# Patient Record
Sex: MALE | Race: BLACK OR AFRICAN AMERICAN | NOT HISPANIC OR LATINO | Employment: OTHER | URBAN - METROPOLITAN AREA
[De-identification: names, ages, dates, MRNs, and addresses within clinical notes are randomized per-mention and may not be internally consistent; named-entity substitution may affect disease eponyms.]

---

## 2020-10-16 ENCOUNTER — OFFICE VISIT (OUTPATIENT)
Dept: PODIATRY | Facility: CLINIC | Age: 75
End: 2020-10-16
Payer: MEDICARE

## 2020-10-16 VITALS
BODY MASS INDEX: 30.45 KG/M2 | SYSTOLIC BLOOD PRESSURE: 133 MMHG | HEIGHT: 75 IN | HEART RATE: 79 BPM | DIASTOLIC BLOOD PRESSURE: 70 MMHG | WEIGHT: 244.94 LBS

## 2020-10-16 DIAGNOSIS — E11.51 TYPE II DIABETES MELLITUS WITH PERIPHERAL CIRCULATORY DISORDER: Primary | ICD-10-CM

## 2020-10-16 DIAGNOSIS — B35.1 DERMATOPHYTOSIS OF NAIL: ICD-10-CM

## 2020-10-16 PROCEDURE — 99203 OFFICE O/P NEW LOW 30 MIN: CPT | Mod: 25,S$GLB,, | Performed by: PODIATRIST

## 2020-10-16 PROCEDURE — 1126F PR PAIN SEVERITY QUANTIFIED, NO PAIN PRESENT: ICD-10-PCS | Mod: S$GLB,,, | Performed by: PODIATRIST

## 2020-10-16 PROCEDURE — 1126F AMNT PAIN NOTED NONE PRSNT: CPT | Mod: S$GLB,,, | Performed by: PODIATRIST

## 2020-10-16 PROCEDURE — 1159F MED LIST DOCD IN RCRD: CPT | Mod: S$GLB,,, | Performed by: PODIATRIST

## 2020-10-16 PROCEDURE — 1159F PR MEDICATION LIST DOCUMENTED IN MEDICAL RECORD: ICD-10-PCS | Mod: S$GLB,,, | Performed by: PODIATRIST

## 2020-10-16 PROCEDURE — 99999 PR PBB SHADOW E&M-NEW PATIENT-LVL III: ICD-10-PCS | Mod: PBBFAC,,, | Performed by: PODIATRIST

## 2020-10-16 PROCEDURE — 99203 PR OFFICE/OUTPT VISIT, NEW, LEVL III, 30-44 MIN: ICD-10-PCS | Mod: 25,S$GLB,, | Performed by: PODIATRIST

## 2020-10-16 PROCEDURE — 11721 PR DEBRIDEMENT OF NAILS, 6 OR MORE: ICD-10-PCS | Mod: Q8,S$GLB,, | Performed by: PODIATRIST

## 2020-10-16 PROCEDURE — 11721 DEBRIDE NAIL 6 OR MORE: CPT | Mod: Q8,S$GLB,, | Performed by: PODIATRIST

## 2020-10-16 PROCEDURE — 99999 PR PBB SHADOW E&M-NEW PATIENT-LVL III: CPT | Mod: PBBFAC,,, | Performed by: PODIATRIST

## 2020-10-16 RX ORDER — MEMANTINE HYDROCHLORIDE 10 MG/1
TABLET ORAL
COMMUNITY
Start: 2020-10-01

## 2020-10-16 RX ORDER — DEXTROSE 4 G
TABLET,CHEWABLE ORAL
COMMUNITY
Start: 2020-08-21

## 2020-10-16 RX ORDER — TRAZODONE HYDROCHLORIDE 50 MG/1
50 TABLET ORAL DAILY
COMMUNITY
Start: 2020-10-01

## 2020-10-16 RX ORDER — BENZONATATE 100 MG/1
100 CAPSULE ORAL 3 TIMES DAILY PRN
COMMUNITY

## 2020-10-16 RX ORDER — PANTOPRAZOLE SODIUM 40 MG/1
TABLET, DELAYED RELEASE ORAL
COMMUNITY
Start: 2020-10-01

## 2020-10-16 RX ORDER — ACETAMINOPHEN 500 MG
500 TABLET ORAL EVERY 6 HOURS PRN
COMMUNITY

## 2020-10-16 RX ORDER — AMLODIPINE BESYLATE 10 MG/1
10 TABLET ORAL DAILY
COMMUNITY
Start: 2020-09-15

## 2020-10-16 RX ORDER — LORAZEPAM 1 MG/1
TABLET ORAL
COMMUNITY
Start: 2019-10-24

## 2020-10-16 RX ORDER — IRON,CARBONYL/ASCORBIC ACID 100-250 MG
TABLET ORAL
COMMUNITY
Start: 2020-06-26

## 2020-10-16 RX ORDER — INSULIN GLARGINE 100 [IU]/ML
20 INJECTION, SOLUTION SUBCUTANEOUS
COMMUNITY
Start: 2020-04-14

## 2020-10-16 RX ORDER — BLOOD SUGAR DIAGNOSTIC
STRIP MISCELLANEOUS
COMMUNITY
Start: 2020-08-20

## 2020-10-16 RX ORDER — TERAZOSIN 2 MG/1
CAPSULE ORAL
COMMUNITY
Start: 2020-10-12

## 2020-10-16 RX ORDER — DONEPEZIL HYDROCHLORIDE 10 MG/1
TABLET, FILM COATED ORAL
COMMUNITY
Start: 2020-10-01

## 2020-10-16 RX ORDER — ATORVASTATIN CALCIUM 20 MG/1
TABLET, FILM COATED ORAL
COMMUNITY
Start: 2020-10-01

## 2020-10-16 RX ORDER — LANCETS 30 GAUGE
EACH MISCELLANEOUS
COMMUNITY
Start: 2020-09-08

## 2020-10-16 RX ORDER — CHOLECALCIFEROL (VITAMIN D3) 25 MCG
TABLET ORAL
COMMUNITY
Start: 2020-09-15

## 2020-10-16 RX ORDER — PEN NEEDLE, DIABETIC 31 GX5/16"
NEEDLE, DISPOSABLE MISCELLANEOUS
COMMUNITY
Start: 2020-08-03

## 2020-10-16 RX ORDER — LOSARTAN POTASSIUM 100 MG/1
TABLET ORAL
COMMUNITY
Start: 2020-10-01

## 2020-10-25 PROBLEM — E11.51 TYPE II DIABETES MELLITUS WITH PERIPHERAL CIRCULATORY DISORDER: Status: ACTIVE | Noted: 2020-10-25

## 2020-10-26 NOTE — PROGRESS NOTES
"Subjective:     Patient ID: Suraj Giraldo is a 74 y.o. male.    Chief Complaint: Diabetic Foot Exam (no c/o pain. wears tennis shoes with socks. diabteic Pt. PCP Dr. Thacker.)    Suraj is a 74 y.o. male who presents to the clinic for evaluation and treatment of high risk feet. Suraj has a past medical history of CHF (congestive heart failure), Diabetes mellitus, type 2, Disorder of kidney and ureter, Hypertension, and Stroke. The patient's chief complaint is long, thick toenails. This patient has documented high risk feet requiring routine maintenance secondary to diabetes mellitis and those secondary complications of diabetes, as mentioned..    PCP: Deborah Thacker MD    Date Last Seen by PCP: 9/12/2020    Current shoe gear:  Affected Foot: Tennis shoes     Unaffected Foot: Tennis shoes    Hemoglobin A1C   Date Value Ref Range Status   09/17/2020 5.7 (H) 4.8 - 5.6 % Final     Comment:              Prediabetes: 5.7 - 6.4           Diabetes: >6.4           Glycemic control for adults with diabetes: <7.0   04/07/2020 4.7 <=6.5 % Final       Patient Active Problem List   Diagnosis    Type II diabetes mellitus with peripheral circulatory disorder       Medication List with Changes/Refills   Current Medications    ACETAMINOPHEN (TYLENOL) 500 MG TABLET    Take 500 mg by mouth every 6 (six) hours as needed.    AMLODIPINE (NORVASC) 10 MG TABLET    Take 10 mg by mouth once daily.    ATORVASTATIN (LIPITOR) 20 MG TABLET        BD ULTRA-FINE ANTHONY PEN NEEDLE 32 GAUGE X 5/32" NDLE        BENZONATATE (TESSALON) 100 MG CAPSULE    Take 100 mg by mouth 3 (three) times daily as needed.    BLOOD SUGAR DIAGNOSTIC (ACCU-CHEK KYLIE PLUS TEST STRP) STRP    Test twice daily    BLOOD-GLUCOSE METER (ACCU-CHEK KYLIE PLUS METER) MISC    USE TO MONITOR BLOOD GLUCOSE    DONEPEZIL (ARICEPT) 10 MG TABLET        INSULIN GLARGINE 100 UNITS/ML (3ML) SUBQ PEN    Inject 20 Units into the skin.    IRON-VITAMIN C 100-250 MG, ICAR-C, 100-250 MG " "TAB    TAKE 1 TABLET BY MOUTH TWICE DAILY    LORAZEPAM (ATIVAN) 1 MG TABLET    TAKE 1 TABLET BY MOUTH TWICE DAILY AS NEEDED FOR ANXIETY    LOSARTAN (COZAAR) 100 MG TABLET        MEMANTINE (NAMENDA) 10 MG TAB        PANTOPRAZOLE (PROTONIX) 40 MG TABLET        SURE COMFORT LANCETS 30 GAUGE MISC        TERAZOSIN (HYTRIN) 2 MG CAPSULE        TRAZODONE (DESYREL) 50 MG TABLET    Take 50 mg by mouth once daily.    VITAMIN D (VITAMIN D3) 1000 UNITS TAB    TAKE 1 TABLET BY MOUTH DAILY       Review of patient's allergies indicates:  No Known Allergies    History reviewed. No pertinent surgical history.    History reviewed. No pertinent family history.    Social History     Socioeconomic History    Marital status:      Spouse name: Not on file    Number of children: Not on file    Years of education: Not on file    Highest education level: Not on file   Occupational History    Not on file   Social Needs    Financial resource strain: Not on file    Food insecurity     Worry: Not on file     Inability: Not on file    Transportation needs     Medical: Not on file     Non-medical: Not on file   Tobacco Use    Smoking status: Never Smoker    Smokeless tobacco: Former User   Substance and Sexual Activity    Alcohol use: Never     Frequency: Never    Drug use: Never    Sexual activity: Not on file   Lifestyle    Physical activity     Days per week: Not on file     Minutes per session: Not on file    Stress: Not on file   Relationships    Social connections     Talks on phone: Not on file     Gets together: Not on file     Attends Christian service: Not on file     Active member of club or organization: Not on file     Attends meetings of clubs or organizations: Not on file     Relationship status: Not on file   Other Topics Concern    Not on file   Social History Narrative    Not on file       Vitals:    10/16/20 0919   BP: 133/70   Pulse: 79   Weight: 111.1 kg (244 lb 14.9 oz)   Height: 6' 3" (1.905 m) "   PainSc: 0-No pain   PainLoc: Foot       Hemoglobin A1C   Date Value Ref Range Status   09/17/2020 5.7 (H) 4.8 - 5.6 % Final     Comment:              Prediabetes: 5.7 - 6.4           Diabetes: >6.4           Glycemic control for adults with diabetes: <7.0   04/07/2020 4.7 <=6.5 % Final       Review of Systems   Constitutional: Negative for chills and fever.   Respiratory: Negative for shortness of breath.    Cardiovascular: Positive for leg swelling. Negative for chest pain, palpitations, orthopnea and claudication.   Gastrointestinal: Negative for diarrhea, nausea and vomiting.   Musculoskeletal: Negative for joint pain.   Skin: Negative for rash.   Neurological: Positive for tingling and sensory change.   Psychiatric/Behavioral: Positive for memory loss.           Objective:      PHYSICAL EXAM: Apperance: Alert and orient in no distress,well developed, and with good attention to grooming and body habits  Patient presents ambulating in tennis shoes.   LOWER EXTREMITY EXAM:  VASCULAR: Dorsalis pedis pulses 0/4 bilateral and Posterior Tibial pulses 1/4 bilateral. Capillary fill time <4 seconds bilateral. Mild edema observed bilateral. Varicosities present bilateral. Skin temperature of the lower extremities is warm to warm, proximal to distal. Hair growth absent bilateral.  DERMATOLOGICAL: No skin rashes, subcutaneous nodules, lesions, or ulcers observed bilateral. Nails 1,2,3,4,5 bilateral elongated, thickened, and discolored with subungual debris. Webspaces 1,2,3,4 bilateral clean, dry and without evidence of break in skin integrity.   NEUROLOGICAL: Light touch, sharp-dull, proprioception all present and equal bilaterally.  Vibratory sensation intact at bilateral hallux. Protective sensation intact at sites as tested with a [place]-Vickie 5.07 monofilament.   MUSCULOSKELETAL: Muscle strength is 5/5 for foot inverters, everters, plantarflexors, and dorsiflexors. Muscle tone is normal.         Assessment:        Encounter Diagnoses   Name Primary?    Type II diabetes mellitus with peripheral circulatory disorder Yes    Dermatophytosis of nail          Plan:   Type II diabetes mellitus with peripheral circulatory disorder    Dermatophytosis of nail      I counseled the patient on his conditions, regarding findings of my examination, my impressions, and usual treatment plan.   Greater than 50% of this visit spent on counseling and coordination of care.  Greater than 15 minutes of a 20 minute appointment spent on education about the diabetic foot, neuropathy, and prevention of limb loss.  Shoe inspection. Diabetic Foot Education. Patient reminded of the importance of good nutrition and blood sugar control to help prevent podiatric complications of diabetes. Patient instructed on proper foot hygeine. We discussed wearing proper shoe gear, daily foot inspections, never walking without protective shoe gear, never putting sharp instruments to feet.    With patient's permission, nails 1-5 bilateral were debrided/trimmed in length and thickness aggressively to their soft tissue attachment mechanically and with electric , removing all offending nail and debris. Patient relates relief following the procedure.  Patient  will continue to monitor the areas daily, inspect feet, wear protective shoe gear when ambulatory, moisturizer to maintain skin integrity. Patient reminded of the importance of good nutrition and blood sugar control to help prevent podiatric complications of diabetes.  Patient to return 3 months or sooner if needed.               Maria C Dejesus DPM  [facility]

## 2021-02-19 ENCOUNTER — OFFICE VISIT (OUTPATIENT)
Dept: PODIATRY | Facility: CLINIC | Age: 76
End: 2021-02-19
Payer: MEDICARE

## 2021-02-19 VITALS
SYSTOLIC BLOOD PRESSURE: 137 MMHG | HEART RATE: 74 BPM | WEIGHT: 244 LBS | DIASTOLIC BLOOD PRESSURE: 77 MMHG | BODY MASS INDEX: 30.34 KG/M2 | HEIGHT: 75 IN

## 2021-02-19 DIAGNOSIS — B35.1 DERMATOPHYTOSIS OF NAIL: ICD-10-CM

## 2021-02-19 DIAGNOSIS — E11.51 TYPE II DIABETES MELLITUS WITH PERIPHERAL CIRCULATORY DISORDER: Primary | ICD-10-CM

## 2021-02-19 PROCEDURE — 99499 NO LOS: ICD-10-PCS | Mod: S$GLB,,, | Performed by: PODIATRIST

## 2021-02-19 PROCEDURE — 99999 PR PBB SHADOW E&M-EST. PATIENT-LVL IV: CPT | Mod: PBBFAC,,, | Performed by: PODIATRIST

## 2021-02-19 PROCEDURE — 11721 DEBRIDE NAIL 6 OR MORE: CPT | Mod: Q8,S$GLB,, | Performed by: PODIATRIST

## 2021-02-19 PROCEDURE — 11721 PR DEBRIDEMENT OF NAILS, 6 OR MORE: ICD-10-PCS | Mod: Q8,S$GLB,, | Performed by: PODIATRIST

## 2021-02-19 PROCEDURE — 3288F FALL RISK ASSESSMENT DOCD: CPT | Mod: CPTII,S$GLB,, | Performed by: PODIATRIST

## 2021-02-19 PROCEDURE — 1101F PT FALLS ASSESS-DOCD LE1/YR: CPT | Mod: CPTII,S$GLB,, | Performed by: PODIATRIST

## 2021-02-19 PROCEDURE — 3288F PR FALLS RISK ASSESSMENT DOCUMENTED: ICD-10-PCS | Mod: CPTII,S$GLB,, | Performed by: PODIATRIST

## 2021-02-19 PROCEDURE — 99999 PR PBB SHADOW E&M-EST. PATIENT-LVL IV: ICD-10-PCS | Mod: PBBFAC,,, | Performed by: PODIATRIST

## 2021-02-19 PROCEDURE — 99499 UNLISTED E&M SERVICE: CPT | Mod: S$GLB,,, | Performed by: PODIATRIST

## 2021-02-19 PROCEDURE — 1126F AMNT PAIN NOTED NONE PRSNT: CPT | Mod: S$GLB,,, | Performed by: PODIATRIST

## 2021-02-19 PROCEDURE — 1126F PR PAIN SEVERITY QUANTIFIED, NO PAIN PRESENT: ICD-10-PCS | Mod: S$GLB,,, | Performed by: PODIATRIST

## 2021-02-19 PROCEDURE — 1101F PR PT FALLS ASSESS DOC 0-1 FALLS W/OUT INJ PAST YR: ICD-10-PCS | Mod: CPTII,S$GLB,, | Performed by: PODIATRIST

## 2021-06-25 ENCOUNTER — OFFICE VISIT (OUTPATIENT)
Dept: PODIATRY | Facility: CLINIC | Age: 76
End: 2021-06-25
Payer: MEDICARE

## 2021-06-25 VITALS — HEIGHT: 75 IN | BODY MASS INDEX: 30.34 KG/M2 | WEIGHT: 244 LBS

## 2021-06-25 DIAGNOSIS — B35.1 DERMATOPHYTOSIS OF NAIL: ICD-10-CM

## 2021-06-25 DIAGNOSIS — E11.51 TYPE II DIABETES MELLITUS WITH PERIPHERAL CIRCULATORY DISORDER: Primary | ICD-10-CM

## 2021-06-25 PROCEDURE — 99499 UNLISTED E&M SERVICE: CPT | Mod: S$GLB,,, | Performed by: PODIATRIST

## 2021-06-25 PROCEDURE — 99499 NO LOS: ICD-10-PCS | Mod: S$GLB,,, | Performed by: PODIATRIST

## 2021-06-25 PROCEDURE — 1126F PR PAIN SEVERITY QUANTIFIED, NO PAIN PRESENT: ICD-10-PCS | Mod: S$GLB,,, | Performed by: PODIATRIST

## 2021-06-25 PROCEDURE — 3288F FALL RISK ASSESSMENT DOCD: CPT | Mod: CPTII,S$GLB,, | Performed by: PODIATRIST

## 2021-06-25 PROCEDURE — 1101F PR PT FALLS ASSESS DOC 0-1 FALLS W/OUT INJ PAST YR: ICD-10-PCS | Mod: CPTII,S$GLB,, | Performed by: PODIATRIST

## 2021-06-25 PROCEDURE — 11721 DEBRIDE NAIL 6 OR MORE: CPT | Mod: Q8,S$GLB,, | Performed by: PODIATRIST

## 2021-06-25 PROCEDURE — 1126F AMNT PAIN NOTED NONE PRSNT: CPT | Mod: S$GLB,,, | Performed by: PODIATRIST

## 2021-06-25 PROCEDURE — 1101F PT FALLS ASSESS-DOCD LE1/YR: CPT | Mod: CPTII,S$GLB,, | Performed by: PODIATRIST

## 2021-06-25 PROCEDURE — 3288F PR FALLS RISK ASSESSMENT DOCUMENTED: ICD-10-PCS | Mod: CPTII,S$GLB,, | Performed by: PODIATRIST

## 2021-06-25 PROCEDURE — 99999 PR PBB SHADOW E&M-EST. PATIENT-LVL III: CPT | Mod: PBBFAC,,, | Performed by: PODIATRIST

## 2021-06-25 PROCEDURE — 99999 PR PBB SHADOW E&M-EST. PATIENT-LVL III: ICD-10-PCS | Mod: PBBFAC,,, | Performed by: PODIATRIST

## 2021-06-25 PROCEDURE — 11721 PR DEBRIDEMENT OF NAILS, 6 OR MORE: ICD-10-PCS | Mod: Q8,S$GLB,, | Performed by: PODIATRIST

## 2021-11-11 ENCOUNTER — OFFICE VISIT (OUTPATIENT)
Dept: PODIATRY | Facility: CLINIC | Age: 76
End: 2021-11-11
Payer: MEDICARE

## 2021-11-11 VITALS — HEIGHT: 75 IN | BODY MASS INDEX: 30.34 KG/M2 | WEIGHT: 244.06 LBS

## 2021-11-11 DIAGNOSIS — B35.1 DERMATOPHYTOSIS OF NAIL: ICD-10-CM

## 2021-11-11 DIAGNOSIS — E11.51 TYPE II DIABETES MELLITUS WITH PERIPHERAL CIRCULATORY DISORDER: Primary | ICD-10-CM

## 2021-11-11 PROCEDURE — 11721 PR DEBRIDEMENT OF NAILS, 6 OR MORE: ICD-10-PCS | Mod: Q8,S$GLB,, | Performed by: PODIATRIST

## 2021-11-11 PROCEDURE — 1160F RVW MEDS BY RX/DR IN RCRD: CPT | Mod: CPTII,S$GLB,, | Performed by: PODIATRIST

## 2021-11-11 PROCEDURE — 1159F PR MEDICATION LIST DOCUMENTED IN MEDICAL RECORD: ICD-10-PCS | Mod: CPTII,S$GLB,, | Performed by: PODIATRIST

## 2021-11-11 PROCEDURE — 99999 PR PBB SHADOW E&M-EST. PATIENT-LVL III: CPT | Mod: PBBFAC,,, | Performed by: PODIATRIST

## 2021-11-11 PROCEDURE — 1160F PR REVIEW ALL MEDS BY PRESCRIBER/CLIN PHARMACIST DOCUMENTED: ICD-10-PCS | Mod: CPTII,S$GLB,, | Performed by: PODIATRIST

## 2021-11-11 PROCEDURE — 99999 PR PBB SHADOW E&M-EST. PATIENT-LVL III: ICD-10-PCS | Mod: PBBFAC,,, | Performed by: PODIATRIST

## 2021-11-11 PROCEDURE — 3288F FALL RISK ASSESSMENT DOCD: CPT | Mod: CPTII,S$GLB,, | Performed by: PODIATRIST

## 2021-11-11 PROCEDURE — 1101F PT FALLS ASSESS-DOCD LE1/YR: CPT | Mod: CPTII,S$GLB,, | Performed by: PODIATRIST

## 2021-11-11 PROCEDURE — 1159F MED LIST DOCD IN RCRD: CPT | Mod: CPTII,S$GLB,, | Performed by: PODIATRIST

## 2021-11-11 PROCEDURE — 3288F PR FALLS RISK ASSESSMENT DOCUMENTED: ICD-10-PCS | Mod: CPTII,S$GLB,, | Performed by: PODIATRIST

## 2021-11-11 PROCEDURE — 99499 UNLISTED E&M SERVICE: CPT | Mod: S$GLB,,, | Performed by: PODIATRIST

## 2021-11-11 PROCEDURE — 1101F PR PT FALLS ASSESS DOC 0-1 FALLS W/OUT INJ PAST YR: ICD-10-PCS | Mod: CPTII,S$GLB,, | Performed by: PODIATRIST

## 2021-11-11 PROCEDURE — 99499 NO LOS: ICD-10-PCS | Mod: S$GLB,,, | Performed by: PODIATRIST

## 2021-11-11 PROCEDURE — 11721 DEBRIDE NAIL 6 OR MORE: CPT | Mod: Q8,S$GLB,, | Performed by: PODIATRIST

## 2022-03-11 ENCOUNTER — OFFICE VISIT (OUTPATIENT)
Dept: PODIATRY | Facility: CLINIC | Age: 77
End: 2022-03-11
Payer: MEDICARE

## 2022-03-11 VITALS
HEART RATE: 67 BPM | HEIGHT: 75 IN | DIASTOLIC BLOOD PRESSURE: 74 MMHG | SYSTOLIC BLOOD PRESSURE: 149 MMHG | BODY MASS INDEX: 30.34 KG/M2 | WEIGHT: 244 LBS

## 2022-03-11 DIAGNOSIS — E11.51 TYPE II DIABETES MELLITUS WITH PERIPHERAL CIRCULATORY DISORDER: Primary | ICD-10-CM

## 2022-03-11 DIAGNOSIS — B35.1 DERMATOPHYTOSIS OF NAIL: ICD-10-CM

## 2022-03-11 PROBLEM — I50.9 CHF (CONGESTIVE HEART FAILURE): Status: ACTIVE | Noted: 2022-03-11

## 2022-03-11 PROBLEM — I10 HTN (HYPERTENSION): Status: ACTIVE | Noted: 2022-03-11

## 2022-03-11 PROCEDURE — 99999 PR PBB SHADOW E&M-EST. PATIENT-LVL IV: CPT | Mod: PBBFAC,,, | Performed by: PODIATRIST

## 2022-03-11 PROCEDURE — 3288F PR FALLS RISK ASSESSMENT DOCUMENTED: ICD-10-PCS | Mod: CPTII,S$GLB,, | Performed by: PODIATRIST

## 2022-03-11 PROCEDURE — 3077F PR MOST RECENT SYSTOLIC BLOOD PRESSURE >= 140 MM HG: ICD-10-PCS | Mod: CPTII,S$GLB,, | Performed by: PODIATRIST

## 2022-03-11 PROCEDURE — 1101F PT FALLS ASSESS-DOCD LE1/YR: CPT | Mod: CPTII,S$GLB,, | Performed by: PODIATRIST

## 2022-03-11 PROCEDURE — 3078F PR MOST RECENT DIASTOLIC BLOOD PRESSURE < 80 MM HG: ICD-10-PCS | Mod: CPTII,S$GLB,, | Performed by: PODIATRIST

## 2022-03-11 PROCEDURE — 11721 DEBRIDE NAIL 6 OR MORE: CPT | Mod: Q8,S$GLB,, | Performed by: PODIATRIST

## 2022-03-11 PROCEDURE — 3078F DIAST BP <80 MM HG: CPT | Mod: CPTII,S$GLB,, | Performed by: PODIATRIST

## 2022-03-11 PROCEDURE — 1126F PR PAIN SEVERITY QUANTIFIED, NO PAIN PRESENT: ICD-10-PCS | Mod: CPTII,S$GLB,, | Performed by: PODIATRIST

## 2022-03-11 PROCEDURE — 99999 PR PBB SHADOW E&M-EST. PATIENT-LVL IV: ICD-10-PCS | Mod: PBBFAC,,, | Performed by: PODIATRIST

## 2022-03-11 PROCEDURE — 1101F PR PT FALLS ASSESS DOC 0-1 FALLS W/OUT INJ PAST YR: ICD-10-PCS | Mod: CPTII,S$GLB,, | Performed by: PODIATRIST

## 2022-03-11 PROCEDURE — 3288F FALL RISK ASSESSMENT DOCD: CPT | Mod: CPTII,S$GLB,, | Performed by: PODIATRIST

## 2022-03-11 PROCEDURE — 1159F PR MEDICATION LIST DOCUMENTED IN MEDICAL RECORD: ICD-10-PCS | Mod: CPTII,S$GLB,, | Performed by: PODIATRIST

## 2022-03-11 PROCEDURE — 1159F MED LIST DOCD IN RCRD: CPT | Mod: CPTII,S$GLB,, | Performed by: PODIATRIST

## 2022-03-11 PROCEDURE — 1160F RVW MEDS BY RX/DR IN RCRD: CPT | Mod: CPTII,S$GLB,, | Performed by: PODIATRIST

## 2022-03-11 PROCEDURE — 1160F PR REVIEW ALL MEDS BY PRESCRIBER/CLIN PHARMACIST DOCUMENTED: ICD-10-PCS | Mod: CPTII,S$GLB,, | Performed by: PODIATRIST

## 2022-03-11 PROCEDURE — 99499 UNLISTED E&M SERVICE: CPT | Mod: S$GLB,,, | Performed by: PODIATRIST

## 2022-03-11 PROCEDURE — 1126F AMNT PAIN NOTED NONE PRSNT: CPT | Mod: CPTII,S$GLB,, | Performed by: PODIATRIST

## 2022-03-11 PROCEDURE — 11721 PR DEBRIDEMENT OF NAILS, 6 OR MORE: ICD-10-PCS | Mod: Q8,S$GLB,, | Performed by: PODIATRIST

## 2022-03-11 PROCEDURE — 3077F SYST BP >= 140 MM HG: CPT | Mod: CPTII,S$GLB,, | Performed by: PODIATRIST

## 2022-03-11 PROCEDURE — 99499 NO LOS: ICD-10-PCS | Mod: S$GLB,,, | Performed by: PODIATRIST

## 2022-03-11 NOTE — PROGRESS NOTES
Subjective:     Patient ID: Suraj Giraldo is a 76 y.o. male.    Chief Complaint: Nail Care (No c/opain, wears tennis shies with socks, diabetic Pt, PCP Dr. Little)    Suraj is a 76 y.o. male who presents to the clinic for evaluation and treatment of high risk feet. Suraj has a past medical history of CHF (congestive heart failure), Diabetes mellitus, type 2, Disorder of kidney and ureter, Hypertension, and Stroke. The patient's chief complaint is long, thick toenails. This patient has documented high risk feet requiring routine maintenance secondary to diabetes mellitis and those secondary complications of diabetes, as mentioned..    PCP: Deborah Thacker MD    Date Last Seen by PCP: 02/17/2022    Current shoe gear:  Affected Foot: Tennis shoes     Unaffected Foot: Tennis shoes    Hemoglobin A1C   Date Value Ref Range Status   02/17/2022 5.3 <=6.5 % Final     Comment:       This assay method is useful in the diagnosis of diabetes  mellitus, identification of patients at risk for developing  diabetes, and monitoring of patients with diabetes  mellitus.  Reference range information and glycemic goals  are based on recommendations from the ADA (American  Diabetes Association).    Hgb A1C Value                Glycemic Goal      <8%                          Less Stringent  <7%                          General (Non-Pregnant Adults)  <6.5%                        More Stringent  5.7% - 6.4%                  Increased risk for diabetes   07/08/2021 5.7 (H) 4.8 - 5.6 % Final     Comment:              Prediabetes: 5.7 - 6.4           Diabetes: >6.4           Glycemic control for adults with diabetes: <7.0   03/10/2021 5.8 (H) 4.8 - 5.6 % Final     Comment:              Prediabetes: 5.7 - 6.4           Diabetes: >6.4           Glycemic control for adults with diabetes: <7.0       Patient Active Problem List   Diagnosis    Type II diabetes mellitus with peripheral circulatory disorder    CHF (congestive heart failure)  "   HTN (hypertension)       Medication List with Changes/Refills   Current Medications    ACETAMINOPHEN (TYLENOL) 500 MG TABLET    Take 500 mg by mouth every 6 (six) hours as needed.    AMLODIPINE (NORVASC) 10 MG TABLET    Take 10 mg by mouth once daily.    ATORVASTATIN (LIPITOR) 20 MG TABLET        BD ULTRA-FINE ANTHONY PEN NEEDLE 32 GAUGE X 5/32" NDLE        BENZONATATE (TESSALON) 100 MG CAPSULE    Take 100 mg by mouth 3 (three) times daily as needed.    BLOOD SUGAR DIAGNOSTIC (ACCU-CHEK KYLIE PLUS TEST STRP) STRP    Test twice daily    BLOOD-GLUCOSE METER MISC    USE TO MONITOR BLOOD GLUCOSE    DONEPEZIL (ARICEPT) 10 MG TABLET        INSULIN GLARGINE 100 UNITS/ML (3ML) SUBQ PEN    Inject 20 Units into the skin.    IRON-VITAMIN C 100-250 MG, ICAR-C, 100-250 MG TAB    TAKE 1 TABLET BY MOUTH TWICE DAILY    LORAZEPAM (ATIVAN) 1 MG TABLET    TAKE 1 TABLET BY MOUTH TWICE DAILY AS NEEDED FOR ANXIETY    LOSARTAN (COZAAR) 100 MG TABLET        MEMANTINE (NAMENDA) 10 MG TAB        PANTOPRAZOLE (PROTONIX) 40 MG TABLET        SURE COMFORT LANCETS 30 GAUGE MISC        TERAZOSIN (HYTRIN) 2 MG CAPSULE        TRAZODONE (DESYREL) 50 MG TABLET    Take 50 mg by mouth once daily.    VITAMIN D (VITAMIN D3) 1000 UNITS TAB    TAKE 1 TABLET BY MOUTH DAILY       Review of patient's allergies indicates:  No Known Allergies    History reviewed. No pertinent surgical history.    History reviewed. No pertinent family history.    Social History     Socioeconomic History    Marital status:    Tobacco Use    Smoking status: Never Smoker    Smokeless tobacco: Former User   Substance and Sexual Activity    Alcohol use: Never    Drug use: Never       Vitals:    03/11/22 0900   BP: (!) 149/74   Pulse: 67   Weight: 110.7 kg (244 lb)   Height: 6' 3" (1.905 m)   PainSc: 0-No pain   PainLoc: Foot       Hemoglobin A1C   Date Value Ref Range Status   02/17/2022 5.3 <=6.5 % Final     Comment:       This assay method is useful in the diagnosis of " diabetes  mellitus, identification of patients at risk for developing  diabetes, and monitoring of patients with diabetes  mellitus.  Reference range information and glycemic goals  are based on recommendations from the ADA (American  Diabetes Association).    Hgb A1C Value                Glycemic Goal      <8%                          Less Stringent  <7%                          General (Non-Pregnant Adults)  <6.5%                        More Stringent  5.7% - 6.4%                  Increased risk for diabetes   07/08/2021 5.7 (H) 4.8 - 5.6 % Final     Comment:              Prediabetes: 5.7 - 6.4           Diabetes: >6.4           Glycemic control for adults with diabetes: <7.0   03/10/2021 5.8 (H) 4.8 - 5.6 % Final     Comment:              Prediabetes: 5.7 - 6.4           Diabetes: >6.4           Glycemic control for adults with diabetes: <7.0       Review of Systems   Constitutional: Negative for chills and fever.   Respiratory: Negative for shortness of breath.    Cardiovascular: Negative for chest pain, palpitations, orthopnea and claudication.   Gastrointestinal: Negative for diarrhea, nausea and vomiting.   Musculoskeletal: Negative for joint pain.   Skin: Negative for rash.   Neurological: Positive for tingling and sensory change.   Psychiatric/Behavioral: Positive for memory loss.           Objective:      PHYSICAL EXAM: Apperance: Alert and orient in no distress,well developed, and with good attention to grooming and body habits  Patient presents ambulating in tennis shoes.   LOWER EXTREMITY EXAM:  VASCULAR: Dorsalis pedis pulses 0/4 bilateral and Posterior Tibial pulses 1/4 bilateral. Capillary fill time <4 seconds bilateral. Mild edema observed bilateral. Varicosities present bilateral. Skin temperature of the lower extremities is warm to warm, proximal to distal. Hair growth absent bilateral.  DERMATOLOGICAL: No skin rashes, subcutaneous nodules, lesions, or ulcers observed bilateral. Nails 1,2,3,4,5  bilateral elongated, thickened, and discolored with subungual debris. Webspaces 1,2,3,4 bilateral clean, dry and without evidence of break in skin integrity.   NEUROLOGICAL: Light touch, sharp-dull, proprioception all present and equal bilaterally.  Vibratory sensation intact at bilateral hallux. Protective sensation intact at sites as tested with a [place]-Vickie 5.07 monofilament.   MUSCULOSKELETAL: Muscle strength is 5/5 for foot inverters, everters, plantarflexors, and dorsiflexors. Muscle tone is normal. Hammertoes noted on right foot.         Assessment:       Encounter Diagnoses   Name Primary?    Type II diabetes mellitus with peripheral circulatory disorder Yes    Dermatophytosis of nail          Plan:   Type II diabetes mellitus with peripheral circulatory disorder    Dermatophytosis of nail      I counseled the patient on his conditions, regarding findings of my examination, my impressions, and usual treatment plan.   Greater than 15 minutes of a 20 minute appointment spent on education about the diabetic foot, neuropathy, and prevention of limb loss.  Shoe inspection. Diabetic Foot Education. Patient reminded of the importance of good nutrition and blood sugar control to help prevent podiatric complications of diabetes. Patient instructed on proper foot hygeine. We discussed wearing proper shoe gear, daily foot inspections, never walking without protective shoe gear, never putting sharp instruments to feet.    With patient's permission, nails 1-5 bilateral were debrided/trimmed in length and thickness aggressively to their soft tissue attachment mechanically and with electric , removing all offending nail and debris. Patient relates relief following the procedure.  Patient  will continue to monitor the areas daily, inspect feet, wear protective shoe gear when ambulatory, moisturizer to maintain skin integrity. Patient reminded of the importance of good nutrition and blood sugar control to help  prevent podiatric complications of diabetes.  Patient to return 3 months or sooner if needed.               Maria C Dejesus DPM  [facility]

## 2022-07-13 ENCOUNTER — OFFICE VISIT (OUTPATIENT)
Dept: PODIATRY | Facility: CLINIC | Age: 77
End: 2022-07-13
Payer: MEDICARE

## 2022-07-13 VITALS
BODY MASS INDEX: 30.34 KG/M2 | SYSTOLIC BLOOD PRESSURE: 135 MMHG | DIASTOLIC BLOOD PRESSURE: 70 MMHG | HEART RATE: 81 BPM | HEIGHT: 75 IN | WEIGHT: 244.06 LBS

## 2022-07-13 DIAGNOSIS — E11.51 TYPE II DIABETES MELLITUS WITH PERIPHERAL CIRCULATORY DISORDER: Primary | ICD-10-CM

## 2022-07-13 DIAGNOSIS — B35.1 DERMATOPHYTOSIS OF NAIL: ICD-10-CM

## 2022-07-13 PROCEDURE — 3288F PR FALLS RISK ASSESSMENT DOCUMENTED: ICD-10-PCS | Mod: CPTII,S$GLB,, | Performed by: PODIATRIST

## 2022-07-13 PROCEDURE — 99213 PR OFFICE/OUTPT VISIT, EST, LEVL III, 20-29 MIN: ICD-10-PCS | Mod: 25,S$GLB,, | Performed by: PODIATRIST

## 2022-07-13 PROCEDURE — 3075F PR MOST RECENT SYSTOLIC BLOOD PRESS GE 130-139MM HG: ICD-10-PCS | Mod: CPTII,S$GLB,, | Performed by: PODIATRIST

## 2022-07-13 PROCEDURE — 1159F PR MEDICATION LIST DOCUMENTED IN MEDICAL RECORD: ICD-10-PCS | Mod: CPTII,S$GLB,, | Performed by: PODIATRIST

## 2022-07-13 PROCEDURE — 1160F RVW MEDS BY RX/DR IN RCRD: CPT | Mod: CPTII,S$GLB,, | Performed by: PODIATRIST

## 2022-07-13 PROCEDURE — 1101F PR PT FALLS ASSESS DOC 0-1 FALLS W/OUT INJ PAST YR: ICD-10-PCS | Mod: CPTII,S$GLB,, | Performed by: PODIATRIST

## 2022-07-13 PROCEDURE — 1101F PT FALLS ASSESS-DOCD LE1/YR: CPT | Mod: CPTII,S$GLB,, | Performed by: PODIATRIST

## 2022-07-13 PROCEDURE — 99999 PR PBB SHADOW E&M-EST. PATIENT-LVL IV: ICD-10-PCS | Mod: PBBFAC,,, | Performed by: PODIATRIST

## 2022-07-13 PROCEDURE — 3075F SYST BP GE 130 - 139MM HG: CPT | Mod: CPTII,S$GLB,, | Performed by: PODIATRIST

## 2022-07-13 PROCEDURE — 99999 PR PBB SHADOW E&M-EST. PATIENT-LVL IV: CPT | Mod: PBBFAC,,, | Performed by: PODIATRIST

## 2022-07-13 PROCEDURE — 11721 DEBRIDE NAIL 6 OR MORE: CPT | Mod: Q8,S$GLB,, | Performed by: PODIATRIST

## 2022-07-13 PROCEDURE — 3288F FALL RISK ASSESSMENT DOCD: CPT | Mod: CPTII,S$GLB,, | Performed by: PODIATRIST

## 2022-07-13 PROCEDURE — 11721 PR DEBRIDEMENT OF NAILS, 6 OR MORE: ICD-10-PCS | Mod: Q8,S$GLB,, | Performed by: PODIATRIST

## 2022-07-13 PROCEDURE — 3078F DIAST BP <80 MM HG: CPT | Mod: CPTII,S$GLB,, | Performed by: PODIATRIST

## 2022-07-13 PROCEDURE — 1159F MED LIST DOCD IN RCRD: CPT | Mod: CPTII,S$GLB,, | Performed by: PODIATRIST

## 2022-07-13 PROCEDURE — 3078F PR MOST RECENT DIASTOLIC BLOOD PRESSURE < 80 MM HG: ICD-10-PCS | Mod: CPTII,S$GLB,, | Performed by: PODIATRIST

## 2022-07-13 PROCEDURE — 1160F PR REVIEW ALL MEDS BY PRESCRIBER/CLIN PHARMACIST DOCUMENTED: ICD-10-PCS | Mod: CPTII,S$GLB,, | Performed by: PODIATRIST

## 2022-07-13 PROCEDURE — 99213 OFFICE O/P EST LOW 20 MIN: CPT | Mod: 25,S$GLB,, | Performed by: PODIATRIST

## 2022-07-13 RX ORDER — FUROSEMIDE 20 MG/1
20 TABLET ORAL DAILY PRN
COMMUNITY
Start: 2021-07-29

## 2022-07-24 NOTE — PROGRESS NOTES
Subjective:     Patient ID: Suraj Giraldo is a 76 y.o. male.    Chief Complaint: Nail Care (Diabetic pt wears tennis shoes w/ socks, PCP Dr. Thacker last seen 5-6-22)    Suraj is a 76 y.o. male who presents to the clinic for evaluation and treatment of high risk feet. Suraj has a past medical history of CHF (congestive heart failure), Diabetes mellitus, type 2, Disorder of kidney and ureter, Hypertension, and Stroke. The patient's chief complaint is long, thick toenails. This patient has documented high risk feet requiring routine maintenance secondary to diabetes mellitis and those secondary complications of diabetes, as mentioned..    PCP: Deborah Thacker MD    Date Last Seen by PCP: 05/06/2022    Current shoe gear:  Affected Foot: Tennis shoes     Unaffected Foot: Tennis shoes    Hemoglobin A1C   Date Value Ref Range Status   05/06/2022 5.8 <=6.5 % Final     Comment:       This assay method is useful in the diagnosis of diabetes  mellitus, identification of patients at risk for developing  diabetes, and monitoring of patients with diabetes  mellitus.  Reference range information and glycemic goals  are based on recommendations from the ADA (American  Diabetes Association).    Hgb A1C Value                Glycemic Goal      <8%                          Less Stringent  <7%                          General (Non-Pregnant Adults)  <6.5%                        More Stringent  5.7% - 6.4%                  Increased risk for diabetes   02/17/2022 5.3 <=6.5 % Final     Comment:       This assay method is useful in the diagnosis of diabetes  mellitus, identification of patients at risk for developing  diabetes, and monitoring of patients with diabetes  mellitus.  Reference range information and glycemic goals  are based on recommendations from the ADA (American  Diabetes Association).    Hgb A1C Value                Glycemic Goal      <8%                          Less Stringent  <7%                          " General (Non-Pregnant Adults)  <6.5%                        More Stringent  5.7% - 6.4%                  Increased risk for diabetes   07/08/2021 5.7 (H) 4.8 - 5.6 % Final     Comment:              Prediabetes: 5.7 - 6.4           Diabetes: >6.4           Glycemic control for adults with diabetes: <7.0       Patient Active Problem List   Diagnosis    Type II diabetes mellitus with peripheral circulatory disorder    CHF (congestive heart failure)    HTN (hypertension)       Medication List with Changes/Refills   Current Medications    ACETAMINOPHEN (TYLENOL) 500 MG TABLET    Take 500 mg by mouth every 6 (six) hours as needed.    AMLODIPINE (NORVASC) 10 MG TABLET    Take 10 mg by mouth once daily.    ATORVASTATIN (LIPITOR) 20 MG TABLET        BD ULTRA-FINE ANTHONY PEN NEEDLE 32 GAUGE X 5/32" NDLE        BENZONATATE (TESSALON) 100 MG CAPSULE    Take 100 mg by mouth 3 (three) times daily as needed.    BLOOD SUGAR DIAGNOSTIC (ACCU-CHEK KYLIE PLUS TEST STRP) STRP    Test twice daily    BLOOD-GLUCOSE METER MISC    USE TO MONITOR BLOOD GLUCOSE    DONEPEZIL (ARICEPT) 10 MG TABLET        FUROSEMIDE (LASIX) 20 MG TABLET    Take 20 mg by mouth daily as needed.    INSULIN GLARGINE 100 UNITS/ML (3ML) SUBQ PEN    Inject 20 Units into the skin.    IRON-VITAMIN C 100-250 MG, ICAR-C, 100-250 MG TAB    TAKE 1 TABLET BY MOUTH TWICE DAILY    LORAZEPAM (ATIVAN) 1 MG TABLET    TAKE 1 TABLET BY MOUTH TWICE DAILY AS NEEDED FOR ANXIETY    LOSARTAN (COZAAR) 100 MG TABLET        MEMANTINE (NAMENDA) 10 MG TAB        PANTOPRAZOLE (PROTONIX) 40 MG TABLET        SURE COMFORT LANCETS 30 GAUGE MISC        TERAZOSIN (HYTRIN) 2 MG CAPSULE        TRAZODONE (DESYREL) 50 MG TABLET    Take 50 mg by mouth once daily.    VITAMIN D (VITAMIN D3) 1000 UNITS TAB    TAKE 1 TABLET BY MOUTH DAILY       Review of patient's allergies indicates:  No Known Allergies    History reviewed. No pertinent surgical history.    History reviewed. No pertinent family " "history.    Social History     Socioeconomic History    Marital status:    Tobacco Use    Smoking status: Never Smoker    Smokeless tobacco: Former User   Substance and Sexual Activity    Alcohol use: Never    Drug use: Never       Vitals:    07/13/22 0938   BP: 135/70   Pulse: 81   Weight: 110.7 kg (244 lb 0.8 oz)   Height: 6' 3" (1.905 m)       Hemoglobin A1C   Date Value Ref Range Status   05/06/2022 5.8 <=6.5 % Final     Comment:       This assay method is useful in the diagnosis of diabetes  mellitus, identification of patients at risk for developing  diabetes, and monitoring of patients with diabetes  mellitus.  Reference range information and glycemic goals  are based on recommendations from the ADA (American  Diabetes Association).    Hgb A1C Value                Glycemic Goal      <8%                          Less Stringent  <7%                          General (Non-Pregnant Adults)  <6.5%                        More Stringent  5.7% - 6.4%                  Increased risk for diabetes   02/17/2022 5.3 <=6.5 % Final     Comment:       This assay method is useful in the diagnosis of diabetes  mellitus, identification of patients at risk for developing  diabetes, and monitoring of patients with diabetes  mellitus.  Reference range information and glycemic goals  are based on recommendations from the ADA (American  Diabetes Association).    Hgb A1C Value                Glycemic Goal      <8%                          Less Stringent  <7%                          General (Non-Pregnant Adults)  <6.5%                        More Stringent  5.7% - 6.4%                  Increased risk for diabetes   07/08/2021 5.7 (H) 4.8 - 5.6 % Final     Comment:              Prediabetes: 5.7 - 6.4           Diabetes: >6.4           Glycemic control for adults with diabetes: <7.0       Review of Systems   Unable to perform ROS: Dementia   Constitutional: Negative for chills and fever.   Respiratory: Negative for shortness " of breath.    Cardiovascular: Negative for chest pain, palpitations, orthopnea, claudication and leg swelling.   Gastrointestinal: Negative for diarrhea, nausea and vomiting.   Musculoskeletal: Negative for joint pain.   Skin: Negative for rash.   Neurological: Negative for dizziness, tingling, sensory change, focal weakness and weakness.   Psychiatric/Behavioral: Negative.              Objective:      PHYSICAL EXAM: Apperance: Alert and orient in no distress,well developed, and with good attention to grooming and body habits  Patient presents ambulating in tennis shoes.   LOWER EXTREMITY EXAM:  VASCULAR: Dorsalis pedis pulses 0/4 bilateral and Posterior Tibial pulses 1/4 bilateral. Capillary fill time <4 seconds bilateral. Mild edema observed bilateral. Varicosities present bilateral. Skin temperature of the lower extremities is warm to warm, proximal to distal. Hair growth absent bilateral.  DERMATOLOGICAL: No skin rashes, subcutaneous nodules, lesions, or ulcers observed bilateral. Nails 1,2,3,4,5 bilateral elongated, thickened, and discolored with subungual debris. Webspaces 1,2,3,4 bilateral clean, dry and without evidence of break in skin integrity.   NEUROLOGICAL: Light touch, sharp-dull, proprioception all present and equal bilaterally.  Vibratory sensation intact at bilateral hallux. Protective sensation intact at sites as tested with a [employer]ickie 5.07 monofilament.   MUSCULOSKELETAL: Muscle strength is 5/5 for foot inverters, everters, plantarflexors, and dorsiflexors. Muscle tone is normal. Hammertoes noted on right foot.           Assessment:       ICD-10-CM ICD-9-CM   1. Type II diabetes mellitus with peripheral circulatory disorder  E11.51 250.70     443.81   2. Dermatophytosis of nail  B35.1 110.1       Plan:   Type II diabetes mellitus with peripheral circulatory disorder    Dermatophytosis of nail      I counseled the patient on his conditions, regarding findings of my examination, my  impressions, and usual treatment plan.   Greater than 50% of this visit spent on counseling and coordination of care.  Greater than 15 minutes of a 20 minute appointment spent on education about the diabetic foot, neuropathy, and prevention of limb loss.  Shoe inspection. Diabetic Foot Education. Patient reminded of the importance of good nutrition and blood sugar control to help prevent podiatric complications of diabetes. Patient instructed on proper foot hygeine. We discussed wearing proper shoe gear, daily foot inspections, never walking without protective shoe gear, never putting sharp instruments to feet.    With patient's permission, nails 1-5 bilateral were debrided/trimmed in length and thickness aggressively to their soft tissue attachment mechanically and with electric , removing all offending nail and debris. Patient relates relief following the procedure.  Patient  will continue to monitor the areas daily, inspect feet, wear protective shoe gear when ambulatory, moisturizer to maintain skin integrity. Patient reminded of the importance of good nutrition and blood sugar control to help prevent podiatric complications of diabetes.  Patient to return 3 months or sooner if needed.          Maria C Dejesus DPM  [facility]

## 2022-10-24 ENCOUNTER — PATIENT MESSAGE (OUTPATIENT)
Dept: RESEARCH | Facility: HOSPITAL | Age: 77
End: 2022-10-24
Payer: MEDICARE

## 2022-11-16 ENCOUNTER — OFFICE VISIT (OUTPATIENT)
Dept: PODIATRY | Facility: CLINIC | Age: 77
End: 2022-11-16
Payer: MEDICARE

## 2022-11-16 VITALS — BODY MASS INDEX: 30.34 KG/M2 | WEIGHT: 244 LBS | HEIGHT: 75 IN

## 2022-11-16 DIAGNOSIS — B35.1 DERMATOPHYTOSIS OF NAIL: ICD-10-CM

## 2022-11-16 DIAGNOSIS — E11.51 TYPE II DIABETES MELLITUS WITH PERIPHERAL CIRCULATORY DISORDER: Primary | ICD-10-CM

## 2022-11-16 PROCEDURE — 1126F PR PAIN SEVERITY QUANTIFIED, NO PAIN PRESENT: ICD-10-PCS | Mod: CPTII,S$GLB,, | Performed by: PODIATRIST

## 2022-11-16 PROCEDURE — 11721 PR DEBRIDEMENT OF NAILS, 6 OR MORE: ICD-10-PCS | Mod: Q9,S$GLB,, | Performed by: PODIATRIST

## 2022-11-16 PROCEDURE — 99499 NO LOS: ICD-10-PCS | Mod: S$GLB,,, | Performed by: PODIATRIST

## 2022-11-16 PROCEDURE — 1126F AMNT PAIN NOTED NONE PRSNT: CPT | Mod: CPTII,S$GLB,, | Performed by: PODIATRIST

## 2022-11-16 PROCEDURE — 3288F FALL RISK ASSESSMENT DOCD: CPT | Mod: CPTII,S$GLB,, | Performed by: PODIATRIST

## 2022-11-16 PROCEDURE — 3288F PR FALLS RISK ASSESSMENT DOCUMENTED: ICD-10-PCS | Mod: CPTII,S$GLB,, | Performed by: PODIATRIST

## 2022-11-16 PROCEDURE — 1159F PR MEDICATION LIST DOCUMENTED IN MEDICAL RECORD: ICD-10-PCS | Mod: CPTII,S$GLB,, | Performed by: PODIATRIST

## 2022-11-16 PROCEDURE — 1160F RVW MEDS BY RX/DR IN RCRD: CPT | Mod: CPTII,S$GLB,, | Performed by: PODIATRIST

## 2022-11-16 PROCEDURE — 1101F PR PT FALLS ASSESS DOC 0-1 FALLS W/OUT INJ PAST YR: ICD-10-PCS | Mod: CPTII,S$GLB,, | Performed by: PODIATRIST

## 2022-11-16 PROCEDURE — 1160F PR REVIEW ALL MEDS BY PRESCRIBER/CLIN PHARMACIST DOCUMENTED: ICD-10-PCS | Mod: CPTII,S$GLB,, | Performed by: PODIATRIST

## 2022-11-16 PROCEDURE — 99999 PR PBB SHADOW E&M-EST. PATIENT-LVL IV: CPT | Mod: PBBFAC,,, | Performed by: PODIATRIST

## 2022-11-16 PROCEDURE — 99999 PR PBB SHADOW E&M-EST. PATIENT-LVL IV: ICD-10-PCS | Mod: PBBFAC,,, | Performed by: PODIATRIST

## 2022-11-16 PROCEDURE — 1159F MED LIST DOCD IN RCRD: CPT | Mod: CPTII,S$GLB,, | Performed by: PODIATRIST

## 2022-11-16 PROCEDURE — 11721 DEBRIDE NAIL 6 OR MORE: CPT | Mod: Q9,S$GLB,, | Performed by: PODIATRIST

## 2022-11-16 PROCEDURE — 99499 UNLISTED E&M SERVICE: CPT | Mod: S$GLB,,, | Performed by: PODIATRIST

## 2022-11-16 PROCEDURE — 1101F PT FALLS ASSESS-DOCD LE1/YR: CPT | Mod: CPTII,S$GLB,, | Performed by: PODIATRIST

## 2022-11-27 NOTE — PROGRESS NOTES
Subjective:     Patient ID: Suraj Giraldo is a 77 y.o. male.    Chief Complaint: Nail Care (No c/o pain, wears tennis shoes with socks, Diabetic Pt, last seen on 05/06/22 with PCP Dr. Thacker )    Suraj is a 77 y.o. male who presents to the clinic for evaluation and treatment of high risk feet. Suraj has a past medical history of CHF (congestive heart failure), Diabetes mellitus, type 2, Disorder of kidney and ureter, Hypertension, and Stroke. The patient's chief complaint is long, thick toenails. This patient has documented high risk feet requiring routine maintenance secondary to diabetes mellitis and those secondary complications of diabetes, as mentioned..    PCP: Deborah Thacker MD    Date Last Seen by PCP: 05/06/2022    Current shoe gear:  Affected Foot: Tennis shoes     Unaffected Foot: Tennis shoes    Hemoglobin A1C   Date Value Ref Range Status   05/06/2022 5.8 <=6.5 % Final     Comment:       This assay method is useful in the diagnosis of diabetes  mellitus, identification of patients at risk for developing  diabetes, and monitoring of patients with diabetes  mellitus.  Reference range information and glycemic goals  are based on recommendations from the ADA (American  Diabetes Association).    Hgb A1C Value                Glycemic Goal      <8%                          Less Stringent  <7%                          General (Non-Pregnant Adults)  <6.5%                        More Stringent  5.7% - 6.4%                  Increased risk for diabetes   02/17/2022 5.3 <=6.5 % Final     Comment:       This assay method is useful in the diagnosis of diabetes  mellitus, identification of patients at risk for developing  diabetes, and monitoring of patients with diabetes  mellitus.  Reference range information and glycemic goals  are based on recommendations from the ADA (American  Diabetes Association).    Hgb A1C Value                Glycemic Goal      <8%                          Less Stringent  <7%       "                    General (Non-Pregnant Adults)  <6.5%                        More Stringent  5.7% - 6.4%                  Increased risk for diabetes   07/08/2021 5.7 (H) 4.8 - 5.6 % Final     Comment:              Prediabetes: 5.7 - 6.4           Diabetes: >6.4           Glycemic control for adults with diabetes: <7.0       Patient Active Problem List   Diagnosis    Type II diabetes mellitus with peripheral circulatory disorder    CHF (congestive heart failure)    HTN (hypertension)       Medication List with Changes/Refills   Current Medications    ACETAMINOPHEN (TYLENOL) 500 MG TABLET    Take 500 mg by mouth every 6 (six) hours as needed.    AMLODIPINE (NORVASC) 10 MG TABLET    Take 10 mg by mouth once daily.    ATORVASTATIN (LIPITOR) 20 MG TABLET        BD ULTRA-FINE ANTHONY PEN NEEDLE 32 GAUGE X 5/32" NDLE        BENZONATATE (TESSALON) 100 MG CAPSULE    Take 100 mg by mouth 3 (three) times daily as needed.    BLOOD SUGAR DIAGNOSTIC (ACCU-CHEK KYLIE PLUS TEST STRP) STRP    Test twice daily    BLOOD-GLUCOSE METER MISC    USE TO MONITOR BLOOD GLUCOSE    DONEPEZIL (ARICEPT) 10 MG TABLET        FUROSEMIDE (LASIX) 20 MG TABLET    Take 20 mg by mouth daily as needed.    INSULIN GLARGINE 100 UNITS/ML (3ML) SUBQ PEN    Inject 20 Units into the skin.    IRON-VITAMIN C 100-250 MG, ICAR-C, 100-250 MG TAB    TAKE 1 TABLET BY MOUTH TWICE DAILY    LORAZEPAM (ATIVAN) 1 MG TABLET    TAKE 1 TABLET BY MOUTH TWICE DAILY AS NEEDED FOR ANXIETY    LOSARTAN (COZAAR) 100 MG TABLET        MEMANTINE (NAMENDA) 10 MG TAB        PANTOPRAZOLE (PROTONIX) 40 MG TABLET        SURE COMFORT LANCETS 30 GAUGE MISC        TERAZOSIN (HYTRIN) 2 MG CAPSULE        TRAZODONE (DESYREL) 50 MG TABLET    Take 50 mg by mouth once daily.    VITAMIN D (VITAMIN D3) 1000 UNITS TAB    TAKE 1 TABLET BY MOUTH DAILY       Review of patient's allergies indicates:  No Known Allergies    History reviewed. No pertinent surgical history.    History reviewed. No pertinent " "family history.    Social History     Socioeconomic History    Marital status:    Tobacco Use    Smoking status: Never    Smokeless tobacco: Former   Substance and Sexual Activity    Alcohol use: Never    Drug use: Never       Vitals:    11/16/22 0915   Weight: 110.7 kg (244 lb)   Height: 6' 3" (1.905 m)   PainSc: 0-No pain   PainLoc: Foot       Hemoglobin A1C   Date Value Ref Range Status   05/06/2022 5.8 <=6.5 % Final     Comment:       This assay method is useful in the diagnosis of diabetes  mellitus, identification of patients at risk for developing  diabetes, and monitoring of patients with diabetes  mellitus.  Reference range information and glycemic goals  are based on recommendations from the ADA (American  Diabetes Association).    Hgb A1C Value                Glycemic Goal      <8%                          Less Stringent  <7%                          General (Non-Pregnant Adults)  <6.5%                        More Stringent  5.7% - 6.4%                  Increased risk for diabetes   02/17/2022 5.3 <=6.5 % Final     Comment:       This assay method is useful in the diagnosis of diabetes  mellitus, identification of patients at risk for developing  diabetes, and monitoring of patients with diabetes  mellitus.  Reference range information and glycemic goals  are based on recommendations from the ADA (American  Diabetes Association).    Hgb A1C Value                Glycemic Goal      <8%                          Less Stringent  <7%                          General (Non-Pregnant Adults)  <6.5%                        More Stringent  5.7% - 6.4%                  Increased risk for diabetes   07/08/2021 5.7 (H) 4.8 - 5.6 % Final     Comment:              Prediabetes: 5.7 - 6.4           Diabetes: >6.4           Glycemic control for adults with diabetes: <7.0       Review of Systems   Unable to perform ROS: Dementia   Constitutional:  Negative for chills and fever.   Respiratory:  Negative for shortness of " breath.    Cardiovascular:  Negative for chest pain, palpitations, orthopnea, claudication and leg swelling.   Gastrointestinal:  Negative for diarrhea, nausea and vomiting.   Musculoskeletal:  Negative for joint pain.   Skin:  Negative for rash.   Neurological:  Negative for dizziness, tingling, sensory change, focal weakness and weakness.   Psychiatric/Behavioral: Negative.             Objective:      PHYSICAL EXAM: Apperance: Alert and orient in no distress,well developed, and with good attention to grooming and body habits  Patient presents ambulating in tennis shoes.   LOWER EXTREMITY EXAM:  VASCULAR: Dorsalis pedis pulses 0/4 bilateral and Posterior Tibial pulses 1/4 bilateral. Capillary fill time <4 seconds bilateral. Mild edema observed bilateral. Varicosities present bilateral. Skin temperature of the lower extremities is warm to warm, proximal to distal. Hair growth absent bilateral.  DERMATOLOGICAL: No skin rashes, subcutaneous nodules, lesions, or ulcers observed bilateral. Nails 1,2,3,4,5 bilateral elongated, thickened, and discolored with subungual debris. Webspaces 1,2,3,4 bilateral clean, dry and without evidence of break in skin integrity.   NEUROLOGICAL: Light touch, sharp-dull, proprioception all present and equal bilaterally.  Vibratory sensation intact at bilateral hallux. Protective sensation intact at sites as tested with a [place]Vickie 5.07 monofilament.   MUSCULOSKELETAL: Muscle strength is 5/5 for foot inverters, everters, plantarflexors, and dorsiflexors. Muscle tone is normal. Hammertoes noted on right foot.           Assessment:       ICD-10-CM ICD-9-CM   1. Type II diabetes mellitus with peripheral circulatory disorder  E11.51 250.70     443.81   2. Dermatophytosis of nail  B35.1 110.1         Plan:   Type II diabetes mellitus with peripheral circulatory disorder    Dermatophytosis of nail      I counseled the patient on his conditions, regarding findings of my examination, my  impressions, and usual treatment plan.   Greater than 12 minutes of a 15 minute appointment spent on education about the diabetic foot, neuropathy, and prevention of limb loss.  Shoe inspection. Diabetic Foot Education. Patient reminded of the importance of good nutrition and blood sugar control to help prevent podiatric complications of diabetes. Patient instructed on proper foot hygeine. We discussed wearing proper shoe gear, daily foot inspections, never walking without protective shoe gear, never putting sharp instruments to feet.    With patient's permission, nails 1-5 bilateral were debrided/trimmed in length and thickness aggressively to their soft tissue attachment mechanically and with electric , removing all offending nail and debris. Patient relates relief following the procedure.  Patient  will continue to monitor the areas daily, inspect feet, wear protective shoe gear when ambulatory, moisturizer to maintain skin integrity. Patient reminded of the importance of good nutrition and blood sugar control to help prevent podiatric complications of diabetes.  Patient to return 3 months or sooner if needed.          Maria C Dejesus DPM  [facility]

## 2023-03-16 ENCOUNTER — OFFICE VISIT (OUTPATIENT)
Dept: PODIATRY | Facility: CLINIC | Age: 78
End: 2023-03-16
Payer: MEDICARE

## 2023-03-16 VITALS — WEIGHT: 244 LBS | BODY MASS INDEX: 30.34 KG/M2 | HEIGHT: 75 IN

## 2023-03-16 DIAGNOSIS — B35.1 DERMATOPHYTOSIS OF NAIL: ICD-10-CM

## 2023-03-16 DIAGNOSIS — E11.51 TYPE II DIABETES MELLITUS WITH PERIPHERAL CIRCULATORY DISORDER: Primary | ICD-10-CM

## 2023-03-16 PROCEDURE — 1101F PR PT FALLS ASSESS DOC 0-1 FALLS W/OUT INJ PAST YR: ICD-10-PCS | Mod: CPTII,S$GLB,, | Performed by: PODIATRIST

## 2023-03-16 PROCEDURE — 1159F PR MEDICATION LIST DOCUMENTED IN MEDICAL RECORD: ICD-10-PCS | Mod: CPTII,S$GLB,, | Performed by: PODIATRIST

## 2023-03-16 PROCEDURE — 99999 PR PBB SHADOW E&M-EST. PATIENT-LVL IV: ICD-10-PCS | Mod: PBBFAC,,, | Performed by: PODIATRIST

## 2023-03-16 PROCEDURE — 3288F FALL RISK ASSESSMENT DOCD: CPT | Mod: CPTII,S$GLB,, | Performed by: PODIATRIST

## 2023-03-16 PROCEDURE — 11721 PR DEBRIDEMENT OF NAILS, 6 OR MORE: ICD-10-PCS | Mod: Q8,S$GLB,, | Performed by: PODIATRIST

## 2023-03-16 PROCEDURE — 1160F RVW MEDS BY RX/DR IN RCRD: CPT | Mod: CPTII,S$GLB,, | Performed by: PODIATRIST

## 2023-03-16 PROCEDURE — 1160F PR REVIEW ALL MEDS BY PRESCRIBER/CLIN PHARMACIST DOCUMENTED: ICD-10-PCS | Mod: CPTII,S$GLB,, | Performed by: PODIATRIST

## 2023-03-16 PROCEDURE — 1101F PT FALLS ASSESS-DOCD LE1/YR: CPT | Mod: CPTII,S$GLB,, | Performed by: PODIATRIST

## 2023-03-16 PROCEDURE — 99499 UNLISTED E&M SERVICE: CPT | Mod: S$GLB,,, | Performed by: PODIATRIST

## 2023-03-16 PROCEDURE — 3288F PR FALLS RISK ASSESSMENT DOCUMENTED: ICD-10-PCS | Mod: CPTII,S$GLB,, | Performed by: PODIATRIST

## 2023-03-16 PROCEDURE — 99999 PR PBB SHADOW E&M-EST. PATIENT-LVL IV: CPT | Mod: PBBFAC,,, | Performed by: PODIATRIST

## 2023-03-16 PROCEDURE — 1126F PR PAIN SEVERITY QUANTIFIED, NO PAIN PRESENT: ICD-10-PCS | Mod: CPTII,S$GLB,, | Performed by: PODIATRIST

## 2023-03-16 PROCEDURE — 1126F AMNT PAIN NOTED NONE PRSNT: CPT | Mod: CPTII,S$GLB,, | Performed by: PODIATRIST

## 2023-03-16 PROCEDURE — 99499 NO LOS: ICD-10-PCS | Mod: S$GLB,,, | Performed by: PODIATRIST

## 2023-03-16 PROCEDURE — 11721 DEBRIDE NAIL 6 OR MORE: CPT | Mod: Q8,S$GLB,, | Performed by: PODIATRIST

## 2023-03-16 PROCEDURE — 1159F MED LIST DOCD IN RCRD: CPT | Mod: CPTII,S$GLB,, | Performed by: PODIATRIST

## 2023-03-25 NOTE — PROGRESS NOTES
Subjective:     Patient ID: Suraj Giraldo is a 77 y.o. male.    Chief Complaint: Nail Care (No c/o pain, Diabetic Pt, wears tennis shoes with socks, PCP Dr. Thacker )      Suraj is a 77 y.o. male who presents to the clinic for evaluation and treatment of high risk feet. Suraj has a past medical history of CHF (congestive heart failure), Diabetes mellitus, type 2, Disorder of kidney and ureter, Hypertension, and Stroke. The patient's chief complaint is long, thick toenails. This patient has documented high risk feet requiring routine maintenance secondary to diabetes mellitis and those secondary complications of diabetes, as mentioned..    PCP: Deborah Thacker MD    Date Last Seen by PCP: 020/3/2023    Current shoe gear:  Affected Foot: Tennis shoes     Unaffected Foot: Tennis shoes    Hemoglobin A1C   Date Value Ref Range Status   05/06/2022 5.8 <=6.5 % Final     Comment:       This assay method is useful in the diagnosis of diabetes  mellitus, identification of patients at risk for developing  diabetes, and monitoring of patients with diabetes  mellitus.  Reference range information and glycemic goals  are based on recommendations from the ADA (American  Diabetes Association).    Hgb A1C Value                Glycemic Goal      <8%                          Less Stringent  <7%                          General (Non-Pregnant Adults)  <6.5%                        More Stringent  5.7% - 6.4%                  Increased risk for diabetes   02/17/2022 5.3 <=6.5 % Final     Comment:       This assay method is useful in the diagnosis of diabetes  mellitus, identification of patients at risk for developing  diabetes, and monitoring of patients with diabetes  mellitus.  Reference range information and glycemic goals  are based on recommendations from the ADA (American  Diabetes Association).    Hgb A1C Value                Glycemic Goal      <8%                          Less Stringent  <7%                           "General (Non-Pregnant Adults)  <6.5%                        More Stringent  5.7% - 6.4%                  Increased risk for diabetes   07/08/2021 5.7 (H) 4.8 - 5.6 % Final     Comment:              Prediabetes: 5.7 - 6.4           Diabetes: >6.4           Glycemic control for adults with diabetes: <7.0       Patient Active Problem List   Diagnosis    Type II diabetes mellitus with peripheral circulatory disorder    CHF (congestive heart failure)    HTN (hypertension)       Medication List with Changes/Refills   Current Medications    ACETAMINOPHEN (TYLENOL) 500 MG TABLET    Take 500 mg by mouth every 6 (six) hours as needed.    AMLODIPINE (NORVASC) 10 MG TABLET    Take 10 mg by mouth once daily.    ATORVASTATIN (LIPITOR) 20 MG TABLET        BD ULTRA-FINE ANTHONY PEN NEEDLE 32 GAUGE X 5/32" NDLE        BENZONATATE (TESSALON) 100 MG CAPSULE    Take 100 mg by mouth 3 (three) times daily as needed.    BLOOD SUGAR DIAGNOSTIC (ACCU-CHEK KYLIE PLUS TEST STRP) STRP    Test twice daily    BLOOD-GLUCOSE METER MISC    USE TO MONITOR BLOOD GLUCOSE    DONEPEZIL (ARICEPT) 10 MG TABLET        FUROSEMIDE (LASIX) 20 MG TABLET    Take 20 mg by mouth daily as needed.    INSULIN GLARGINE 100 UNITS/ML (3ML) SUBQ PEN    Inject 20 Units into the skin.    IRON-VITAMIN C 100-250 MG, ICAR-C, 100-250 MG TAB    TAKE 1 TABLET BY MOUTH TWICE DAILY    LORAZEPAM (ATIVAN) 1 MG TABLET    TAKE 1 TABLET BY MOUTH TWICE DAILY AS NEEDED FOR ANXIETY    LOSARTAN (COZAAR) 100 MG TABLET        MEMANTINE (NAMENDA) 10 MG TAB        PANTOPRAZOLE (PROTONIX) 40 MG TABLET        SURE COMFORT LANCETS 30 GAUGE MISC        TERAZOSIN (HYTRIN) 2 MG CAPSULE        TRAZODONE (DESYREL) 50 MG TABLET    Take 50 mg by mouth once daily.    VITAMIN D (VITAMIN D3) 1000 UNITS TAB    TAKE 1 TABLET BY MOUTH DAILY       Review of patient's allergies indicates:  No Known Allergies    History reviewed. No pertinent surgical history.    History reviewed. No pertinent family " "history.    Social History     Socioeconomic History    Marital status:    Tobacco Use    Smoking status: Never    Smokeless tobacco: Former   Substance and Sexual Activity    Alcohol use: Never    Drug use: Never       Vitals:    03/16/23 0908   Weight: 110.7 kg (244 lb)   Height: 6' 3" (1.905 m)   PainSc: 0-No pain   PainLoc: Foot       Hemoglobin A1C   Date Value Ref Range Status   05/06/2022 5.8 <=6.5 % Final     Comment:       This assay method is useful in the diagnosis of diabetes  mellitus, identification of patients at risk for developing  diabetes, and monitoring of patients with diabetes  mellitus.  Reference range information and glycemic goals  are based on recommendations from the ADA (American  Diabetes Association).    Hgb A1C Value                Glycemic Goal      <8%                          Less Stringent  <7%                          General (Non-Pregnant Adults)  <6.5%                        More Stringent  5.7% - 6.4%                  Increased risk for diabetes   02/17/2022 5.3 <=6.5 % Final     Comment:       This assay method is useful in the diagnosis of diabetes  mellitus, identification of patients at risk for developing  diabetes, and monitoring of patients with diabetes  mellitus.  Reference range information and glycemic goals  are based on recommendations from the ADA (American  Diabetes Association).    Hgb A1C Value                Glycemic Goal      <8%                          Less Stringent  <7%                          General (Non-Pregnant Adults)  <6.5%                        More Stringent  5.7% - 6.4%                  Increased risk for diabetes   07/08/2021 5.7 (H) 4.8 - 5.6 % Final     Comment:              Prediabetes: 5.7 - 6.4           Diabetes: >6.4           Glycemic control for adults with diabetes: <7.0       Review of Systems   Unable to perform ROS: Dementia   Constitutional:  Negative for chills and fever.   Respiratory:  Negative for shortness of breath. "    Cardiovascular:  Negative for chest pain, palpitations, orthopnea, claudication and leg swelling.   Gastrointestinal:  Negative for diarrhea, nausea and vomiting.   Musculoskeletal:  Negative for joint pain.   Skin:  Negative for rash.   Neurological:  Negative for dizziness, tingling, sensory change, focal weakness and weakness.   Psychiatric/Behavioral: Negative.             Objective:      PHYSICAL EXAM: Apperance: Alert and orient in no distress,well developed, and with good attention to grooming and body habits  Patient presents ambulating in tennis shoes.   LOWER EXTREMITY EXAM:  VASCULAR: Dorsalis pedis pulses 0/4 bilateral and Posterior Tibial pulses 1/4 bilateral. Capillary fill time <4 seconds bilateral. Mild edema observed bilateral. Varicosities present bilateral. Skin temperature of the lower extremities is warm to warm, proximal to distal. Hair growth absent bilateral.  DERMATOLOGICAL: No skin rashes, subcutaneous nodules, lesions, or ulcers observed bilateral. Nails 1,2,3,4,5 bilateral elongated, thickened, and discolored with subungual debris. Webspaces 1,2,3,4 bilateral clean, dry and without evidence of break in skin integrity.   NEUROLOGICAL: Light touch, sharp-dull, proprioception all present and equal bilaterally.  Vibratory sensation intact at bilateral hallux. Protective sensation intact at sites as tested with a [place]-Vickie 5.07 monofilament.   MUSCULOSKELETAL: Muscle strength is 5/5 for foot inverters, everters, plantarflexors, and dorsiflexors. Muscle tone is normal. Hammertoes noted on right foot.           Assessment:       ICD-10-CM ICD-9-CM   1. Type II diabetes mellitus with peripheral circulatory disorder  E11.51 250.70     443.81   2. Dermatophytosis of nail  B35.1 110.1         Plan:   Type II diabetes mellitus with peripheral circulatory disorder    Dermatophytosis of nail        I counseled the patient on his conditions, regarding findings of my examination, my  impressions, and usual treatment plan.   Appointment spent on education about the diabetic foot, neuropathy, and prevention of limb loss.  Shoe inspection. Diabetic Foot Education. Patient reminded of the importance of good nutrition and blood sugar control to help prevent podiatric complications of diabetes. Patient instructed on proper foot hygeine. We discussed wearing proper shoe gear, daily foot inspections, never walking without protective shoe gear, never putting sharp instruments to feet.    With patient's permission, nails 1-5 bilateral were debrided/trimmed in length and thickness aggressively to their soft tissue attachment mechanically and with electric , removing all offending nail and debris. Patient relates relief following the procedure.  Patient  will continue to monitor the areas daily, inspect feet, wear protective shoe gear when ambulatory, moisturizer to maintain skin integrity. Patient reminded of the importance of good nutrition and blood sugar control to help prevent podiatric complications of diabetes.  Patient to return 3 months or sooner if needed.        Maria C Dejesus DPM  [facility]

## 2023-07-21 ENCOUNTER — OFFICE VISIT (OUTPATIENT)
Dept: PODIATRY | Facility: CLINIC | Age: 78
End: 2023-07-21
Payer: MEDICARE

## 2023-07-21 VITALS — WEIGHT: 244 LBS | HEIGHT: 75 IN | BODY MASS INDEX: 30.34 KG/M2

## 2023-07-21 DIAGNOSIS — B35.1 DERMATOPHYTOSIS OF NAIL: ICD-10-CM

## 2023-07-21 DIAGNOSIS — E11.51 TYPE II DIABETES MELLITUS WITH PERIPHERAL CIRCULATORY DISORDER: ICD-10-CM

## 2023-07-21 DIAGNOSIS — E11.9 ENCOUNTER FOR COMPREHENSIVE DIABETIC FOOT EXAMINATION, TYPE 2 DIABETES MELLITUS: Primary | ICD-10-CM

## 2023-07-21 PROCEDURE — 1101F PR PT FALLS ASSESS DOC 0-1 FALLS W/OUT INJ PAST YR: ICD-10-PCS | Mod: CPTII,S$GLB,, | Performed by: PODIATRIST

## 2023-07-21 PROCEDURE — 11721 PR DEBRIDEMENT OF NAILS, 6 OR MORE: ICD-10-PCS | Mod: Q9,S$GLB,, | Performed by: PODIATRIST

## 2023-07-21 PROCEDURE — 1101F PT FALLS ASSESS-DOCD LE1/YR: CPT | Mod: CPTII,S$GLB,, | Performed by: PODIATRIST

## 2023-07-21 PROCEDURE — 11721 DEBRIDE NAIL 6 OR MORE: CPT | Mod: Q9,S$GLB,, | Performed by: PODIATRIST

## 2023-07-21 PROCEDURE — 1160F RVW MEDS BY RX/DR IN RCRD: CPT | Mod: CPTII,S$GLB,, | Performed by: PODIATRIST

## 2023-07-21 PROCEDURE — 1160F PR REVIEW ALL MEDS BY PRESCRIBER/CLIN PHARMACIST DOCUMENTED: ICD-10-PCS | Mod: CPTII,S$GLB,, | Performed by: PODIATRIST

## 2023-07-21 PROCEDURE — 99213 OFFICE O/P EST LOW 20 MIN: CPT | Mod: 25,S$GLB,, | Performed by: PODIATRIST

## 2023-07-21 PROCEDURE — 1126F AMNT PAIN NOTED NONE PRSNT: CPT | Mod: CPTII,S$GLB,, | Performed by: PODIATRIST

## 2023-07-21 PROCEDURE — 3288F FALL RISK ASSESSMENT DOCD: CPT | Mod: CPTII,S$GLB,, | Performed by: PODIATRIST

## 2023-07-21 PROCEDURE — 99213 PR OFFICE/OUTPT VISIT, EST, LEVL III, 20-29 MIN: ICD-10-PCS | Mod: 25,S$GLB,, | Performed by: PODIATRIST

## 2023-07-21 PROCEDURE — 1126F PR PAIN SEVERITY QUANTIFIED, NO PAIN PRESENT: ICD-10-PCS | Mod: CPTII,S$GLB,, | Performed by: PODIATRIST

## 2023-07-21 PROCEDURE — 3288F PR FALLS RISK ASSESSMENT DOCUMENTED: ICD-10-PCS | Mod: CPTII,S$GLB,, | Performed by: PODIATRIST

## 2023-07-21 PROCEDURE — 99999 PR PBB SHADOW E&M-EST. PATIENT-LVL IV: CPT | Mod: PBBFAC,,, | Performed by: PODIATRIST

## 2023-07-21 PROCEDURE — 1159F PR MEDICATION LIST DOCUMENTED IN MEDICAL RECORD: ICD-10-PCS | Mod: CPTII,S$GLB,, | Performed by: PODIATRIST

## 2023-07-21 PROCEDURE — 99999 PR PBB SHADOW E&M-EST. PATIENT-LVL IV: ICD-10-PCS | Mod: PBBFAC,,, | Performed by: PODIATRIST

## 2023-07-21 PROCEDURE — 1159F MED LIST DOCD IN RCRD: CPT | Mod: CPTII,S$GLB,, | Performed by: PODIATRIST

## 2023-07-21 NOTE — PROGRESS NOTES
Subjective:     Patient ID: Suraj Giraldo is a 77 y.o. male.    Chief Complaint: Nail Care (No c/o pain, Diabetic Pt, wears tennis shoes with socks, PCP Dr. Thacker)    Suraj is a 77 y.o. male who presents to the clinic for evaluation and treatment of high risk feet. Suraj has a past medical history of CHF (congestive heart failure), Diabetes mellitus, type 2, Disorder of kidney and ureter, Hypertension, and Stroke. The patient's chief complaint is long, thick toenails. This patient has documented high risk feet requiring routine maintenance secondary to diabetes mellitis and those secondary complications of diabetes, as mentioned..    PCP: Deborah Thacker MD    Date Last Seen by PCP: 06/20/2023    Current shoe gear:  Affected Foot: Tennis shoes     Unaffected Foot: Tennis shoes    Hemoglobin A1C   Date Value Ref Range Status   06/20/2023 5.9 (H) 4.8 - 5.6 % Final     Comment:              Prediabetes: 5.7 - 6.4           Diabetes: >6.4           Glycemic control for adults with diabetes: <7.0   05/06/2022 5.8 <=6.5 % Final     Comment:       This assay method is useful in the diagnosis of diabetes  mellitus, identification of patients at risk for developing  diabetes, and monitoring of patients with diabetes  mellitus.  Reference range information and glycemic goals  are based on recommendations from the ADA (American  Diabetes Association).    Hgb A1C Value                Glycemic Goal      <8%                          Less Stringent  <7%                          General (Non-Pregnant Adults)  <6.5%                        More Stringent  5.7% - 6.4%                  Increased risk for diabetes   02/17/2022 5.3 <=6.5 % Final     Comment:       This assay method is useful in the diagnosis of diabetes  mellitus, identification of patients at risk for developing  diabetes, and monitoring of patients with diabetes  mellitus.  Reference range information and glycemic goals  are based on recommendations from the  "ADA (American  Diabetes Association).    Hgb A1C Value                Glycemic Goal      <8%                          Less Stringent  <7%                          General (Non-Pregnant Adults)  <6.5%                        More Stringent  5.7% - 6.4%                  Increased risk for diabetes       Patient Active Problem List   Diagnosis    Type II diabetes mellitus with peripheral circulatory disorder    CHF (congestive heart failure)    HTN (hypertension)       Medication List with Changes/Refills   Current Medications    ACETAMINOPHEN (TYLENOL) 500 MG TABLET    Take 500 mg by mouth every 6 (six) hours as needed.    AMLODIPINE (NORVASC) 10 MG TABLET    Take 10 mg by mouth once daily.    ATORVASTATIN (LIPITOR) 20 MG TABLET        BD ULTRA-FINE ANTHONY PEN NEEDLE 32 GAUGE X 5/32" NDLE        BENZONATATE (TESSALON) 100 MG CAPSULE    Take 100 mg by mouth 3 (three) times daily as needed.    BLOOD SUGAR DIAGNOSTIC (ACCU-CHEK KYLIE PLUS TEST STRP) STRP    Test twice daily    BLOOD-GLUCOSE METER MISC    USE TO MONITOR BLOOD GLUCOSE    DONEPEZIL (ARICEPT) 10 MG TABLET        FUROSEMIDE (LASIX) 20 MG TABLET    Take 20 mg by mouth daily as needed.    INSULIN GLARGINE 100 UNITS/ML (3ML) SUBQ PEN    Inject 20 Units into the skin.    IRON-VITAMIN C 100-250 MG, ICAR-C, 100-250 MG TAB    TAKE 1 TABLET BY MOUTH TWICE DAILY    LORAZEPAM (ATIVAN) 1 MG TABLET    TAKE 1 TABLET BY MOUTH TWICE DAILY AS NEEDED FOR ANXIETY    LOSARTAN (COZAAR) 100 MG TABLET        MEMANTINE (NAMENDA) 10 MG TAB        PANTOPRAZOLE (PROTONIX) 40 MG TABLET        SURE COMFORT LANCETS 30 GAUGE MISC        TERAZOSIN (HYTRIN) 2 MG CAPSULE        TRAZODONE (DESYREL) 50 MG TABLET    Take 50 mg by mouth once daily.    VITAMIN D (VITAMIN D3) 1000 UNITS TAB    TAKE 1 TABLET BY MOUTH DAILY       Review of patient's allergies indicates:  No Known Allergies    History reviewed. No pertinent surgical history.    History reviewed. No pertinent family history.    Social " "History     Socioeconomic History    Marital status:    Tobacco Use    Smoking status: Never    Smokeless tobacco: Former   Substance and Sexual Activity    Alcohol use: Never    Drug use: Never       Vitals:    07/21/23 0904   Weight: 110.7 kg (244 lb)   Height: 6' 3" (1.905 m)   PainSc: 0-No pain   PainLoc: Foot       Hemoglobin A1C   Date Value Ref Range Status   06/20/2023 5.9 (H) 4.8 - 5.6 % Final     Comment:              Prediabetes: 5.7 - 6.4           Diabetes: >6.4           Glycemic control for adults with diabetes: <7.0   05/06/2022 5.8 <=6.5 % Final     Comment:       This assay method is useful in the diagnosis of diabetes  mellitus, identification of patients at risk for developing  diabetes, and monitoring of patients with diabetes  mellitus.  Reference range information and glycemic goals  are based on recommendations from the ADA (American  Diabetes Association).    Hgb A1C Value                Glycemic Goal      <8%                          Less Stringent  <7%                          General (Non-Pregnant Adults)  <6.5%                        More Stringent  5.7% - 6.4%                  Increased risk for diabetes   02/17/2022 5.3 <=6.5 % Final     Comment:       This assay method is useful in the diagnosis of diabetes  mellitus, identification of patients at risk for developing  diabetes, and monitoring of patients with diabetes  mellitus.  Reference range information and glycemic goals  are based on recommendations from the ADA (American  Diabetes Association).    Hgb A1C Value                Glycemic Goal      <8%                          Less Stringent  <7%                          General (Non-Pregnant Adults)  <6.5%                        More Stringent  5.7% - 6.4%                  Increased risk for diabetes       Review of Systems   Unable to perform ROS: Dementia   Constitutional:  Negative for chills and fever.   Respiratory:  Negative for shortness of breath.    Cardiovascular: "  Negative for chest pain, palpitations, orthopnea, claudication and leg swelling.   Gastrointestinal:  Negative for diarrhea, nausea and vomiting.   Musculoskeletal:  Negative for joint pain.   Skin:  Negative for rash.   Neurological:  Negative for dizziness, tingling, sensory change, focal weakness and weakness.   Psychiatric/Behavioral: Negative.               Objective:      PHYSICAL EXAM: Apperance: Alert and orient in no distress,well developed, and with good attention to grooming and body habits  Patient presents ambulating in tennis shoes.   LOWER EXTREMITY EXAM:  VASCULAR: Dorsalis pedis pulses 0/4 bilateral and Posterior Tibial pulses 1/4 bilateral. Capillary fill time <4 seconds bilateral. Mild edema observed bilateral. Varicosities present bilateral. Skin temperature of the lower extremities is warm to warm, proximal to distal. Hair growth absent bilateral.  DERMATOLOGICAL: No skin rashes, subcutaneous nodules, lesions, or ulcers observed bilateral. Nails 1,2,3,4,5 bilateral elongated, thickened, and discolored with subungual debris. Webspaces 1,2,3,4 bilateral clean, dry and without evidence of break in skin integrity.   NEUROLOGICAL: Light touch, sharp-dull, proprioception all present and equal bilaterally.  Vibratory sensation intact at bilateral hallux. Protective sensation intact at sites as tested with a [place]-Vickie 5.07 monofilament.   MUSCULOSKELETAL: Muscle strength is 5/5 for foot inverters, everters, plantarflexors, and dorsiflexors. Muscle tone is normal. Hammertoes noted on right foot.           Assessment:       ICD-10-CM ICD-9-CM   1. Encounter for comprehensive diabetic foot examination, type 2 diabetes mellitus  E11.9 250.00   2. Type II diabetes mellitus with peripheral circulatory disorder  E11.51 250.70     443.81   3. Dermatophytosis of nail  B35.1 110.1         Plan:   Encounter for comprehensive diabetic foot examination, type 2 diabetes mellitus    Type II diabetes mellitus  with peripheral circulatory disorder    Dermatophytosis of nail    I counseled the patient on his conditions, regarding findings of my examination, my impressions, and usual treatment plan.   This visit spent on counseling and coordination of care.  Appointment spent on education about the diabetic foot, neuropathy, and prevention of limb loss.  Shoe inspection. Diabetic Foot Education. Patient reminded of the importance of good nutrition and blood sugar control to help prevent podiatric complications of diabetes. Patient instructed on proper foot hygeine. We discussed wearing proper shoe gear, daily foot inspections, never walking without protective shoe gear, never putting sharp instruments to feet.    With patient's permission, nails 1-5 bilateral were debrided/trimmed in length and thickness aggressively to their soft tissue attachment mechanically and with electric , removing all offending nail and debris. Patient relates relief following the procedure.  Patient  will continue to monitor the areas daily, inspect feet, wear protective shoe gear when ambulatory, moisturizer to maintain skin integrity. Patient reminded of the importance of good nutrition and blood sugar control to help prevent podiatric complications of diabetes.  Patient to return 3 months or sooner if needed.        Maria C Dejesus DPM  [facility]

## 2023-11-29 ENCOUNTER — OFFICE VISIT (OUTPATIENT)
Dept: PODIATRY | Facility: CLINIC | Age: 78
End: 2023-11-29
Payer: MEDICARE

## 2023-11-29 VITALS — HEIGHT: 75 IN | BODY MASS INDEX: 30.34 KG/M2 | WEIGHT: 244.06 LBS

## 2023-11-29 DIAGNOSIS — E11.51 TYPE II DIABETES MELLITUS WITH PERIPHERAL CIRCULATORY DISORDER: Primary | ICD-10-CM

## 2023-11-29 DIAGNOSIS — B35.1 DERMATOPHYTOSIS OF NAIL: ICD-10-CM

## 2023-11-29 PROCEDURE — 11721 DEBRIDE NAIL 6 OR MORE: CPT | Mod: Q8,S$GLB,, | Performed by: PODIATRIST

## 2023-11-29 PROCEDURE — 99999 PR PBB SHADOW E&M-EST. PATIENT-LVL III: CPT | Mod: PBBFAC,,, | Performed by: PODIATRIST

## 2023-11-29 PROCEDURE — 11721 PR DEBRIDEMENT OF NAILS, 6 OR MORE: ICD-10-PCS | Mod: Q8,S$GLB,, | Performed by: PODIATRIST

## 2023-11-29 PROCEDURE — 99999 PR PBB SHADOW E&M-EST. PATIENT-LVL III: ICD-10-PCS | Mod: PBBFAC,,, | Performed by: PODIATRIST

## 2023-11-29 PROCEDURE — 99499 UNLISTED E&M SERVICE: CPT | Mod: S$GLB,,, | Performed by: PODIATRIST

## 2023-11-29 PROCEDURE — 99499 NO LOS: ICD-10-PCS | Mod: S$GLB,,, | Performed by: PODIATRIST

## 2023-11-29 NOTE — PROGRESS NOTES
Subjective:     Patient ID: Suraj Giraldo is a 78 y.o. male.    Chief Complaint: Nail Care (Nail care ( diabetic pt, wearing tennis last seen PCP Dr. Thacker 10/27/23))    Suraj is a 78 y.o. male who presents to the clinic for evaluation and treatment of high risk feet. Suraj has a past medical history of CHF (congestive heart failure), Diabetes mellitus, type 2, Disorder of kidney and ureter, Hypertension, and Stroke. The patient's chief complaint is long, thick toenails. This patient has documented high risk feet requiring routine maintenance secondary to diabetes mellitis and those secondary complications of diabetes, as mentioned..    PCP: Deborah Thacker MD    Date Last Seen by PCP: 10/27/2023    Current shoe gear:  Affected Foot: Tennis shoes     Unaffected Foot: Tennis shoes    Hemoglobin A1C   Date Value Ref Range Status   10/27/2023 6.2 <=6.5 % Final     Comment:       This assay method is useful in the diagnosis of diabetes  mellitus, identification of patients at risk for developing  diabetes, and monitoring of patients with diabetes  mellitus.  Reference range information and glycemic goals  are based on recommendations from the ADA (American  Diabetes Association).    Hgb A1C Value                Glycemic Goal      <8%                          Less Stringent  <7%                          General (Non-Pregnant Adults)  <6.5%                        More Stringent  5.7% - 6.4%                  Increased risk for diabetes   06/20/2023 5.9 (H) 4.8 - 5.6 % Final     Comment:              Prediabetes: 5.7 - 6.4           Diabetes: >6.4           Glycemic control for adults with diabetes: <7.0   05/06/2022 5.8 <=6.5 % Final     Comment:       This assay method is useful in the diagnosis of diabetes  mellitus, identification of patients at risk for developing  diabetes, and monitoring of patients with diabetes  mellitus.  Reference range information and glycemic goals  are based on recommendations from  "the ADA (American  Diabetes Association).    Hgb A1C Value                Glycemic Goal      <8%                          Less Stringent  <7%                          General (Non-Pregnant Adults)  <6.5%                        More Stringent  5.7% - 6.4%                  Increased risk for diabetes       Patient Active Problem List   Diagnosis    Type II diabetes mellitus with peripheral circulatory disorder    CHF (congestive heart failure)    HTN (hypertension)       Medication List with Changes/Refills   Current Medications    ACETAMINOPHEN (TYLENOL) 500 MG TABLET    Take 500 mg by mouth every 6 (six) hours as needed.    AMLODIPINE (NORVASC) 10 MG TABLET    Take 10 mg by mouth once daily.    ATORVASTATIN (LIPITOR) 20 MG TABLET        BD ULTRA-FINE ANTHONY PEN NEEDLE 32 GAUGE X 5/32" NDLE        BENZONATATE (TESSALON) 100 MG CAPSULE    Take 100 mg by mouth 3 (three) times daily as needed.    BLOOD SUGAR DIAGNOSTIC (ACCU-CHEK KYLIE PLUS TEST STRP) STRP    Test twice daily    BLOOD-GLUCOSE METER MISC    USE TO MONITOR BLOOD GLUCOSE    DONEPEZIL (ARICEPT) 10 MG TABLET        FUROSEMIDE (LASIX) 20 MG TABLET    Take 20 mg by mouth daily as needed.    INSULIN GLARGINE 100 UNITS/ML (3ML) SUBQ PEN    Inject 20 Units into the skin.    IRON-VITAMIN C 100-250 MG, ICAR-C, 100-250 MG TAB    TAKE 1 TABLET BY MOUTH TWICE DAILY    LORAZEPAM (ATIVAN) 1 MG TABLET    TAKE 1 TABLET BY MOUTH TWICE DAILY AS NEEDED FOR ANXIETY    LOSARTAN (COZAAR) 100 MG TABLET        MEMANTINE (NAMENDA) 10 MG TAB        PANTOPRAZOLE (PROTONIX) 40 MG TABLET        SURE COMFORT LANCETS 30 GAUGE MISC        TERAZOSIN (HYTRIN) 2 MG CAPSULE        TRAZODONE (DESYREL) 50 MG TABLET    Take 50 mg by mouth once daily.    VITAMIN D (VITAMIN D3) 1000 UNITS TAB    TAKE 1 TABLET BY MOUTH DAILY       Review of patient's allergies indicates:  No Known Allergies    History reviewed. No pertinent surgical history.    History reviewed. No pertinent family " "history.    Social History     Socioeconomic History    Marital status:    Tobacco Use    Smoking status: Never    Smokeless tobacco: Former   Substance and Sexual Activity    Alcohol use: Never    Drug use: Never       Vitals:    11/29/23 0931   Weight: 110.7 kg (244 lb 0.8 oz)   Height: 6' 3" (1.905 m)   PainSc: 0-No pain       Hemoglobin A1C   Date Value Ref Range Status   10/27/2023 6.2 <=6.5 % Final     Comment:       This assay method is useful in the diagnosis of diabetes  mellitus, identification of patients at risk for developing  diabetes, and monitoring of patients with diabetes  mellitus.  Reference range information and glycemic goals  are based on recommendations from the ADA (American  Diabetes Association).    Hgb A1C Value                Glycemic Goal      <8%                          Less Stringent  <7%                          General (Non-Pregnant Adults)  <6.5%                        More Stringent  5.7% - 6.4%                  Increased risk for diabetes   06/20/2023 5.9 (H) 4.8 - 5.6 % Final     Comment:              Prediabetes: 5.7 - 6.4           Diabetes: >6.4           Glycemic control for adults with diabetes: <7.0   05/06/2022 5.8 <=6.5 % Final     Comment:       This assay method is useful in the diagnosis of diabetes  mellitus, identification of patients at risk for developing  diabetes, and monitoring of patients with diabetes  mellitus.  Reference range information and glycemic goals  are based on recommendations from the ADA (American  Diabetes Association).    Hgb A1C Value                Glycemic Goal      <8%                          Less Stringent  <7%                          General (Non-Pregnant Adults)  <6.5%                        More Stringent  5.7% - 6.4%                  Increased risk for diabetes       Review of Systems   Unable to perform ROS: Dementia   Constitutional:  Negative for chills and fever.   Respiratory:  Negative for shortness of breath.  "   Cardiovascular:  Negative for chest pain, palpitations, orthopnea, claudication and leg swelling.   Gastrointestinal:  Negative for diarrhea, nausea and vomiting.   Musculoskeletal:  Negative for joint pain.   Skin:  Negative for rash.   Neurological:  Negative for dizziness, tingling, sensory change, focal weakness and weakness.   Psychiatric/Behavioral: Negative.               Objective:      PHYSICAL EXAM: Apperance: Alert and orient in no distress,well developed, and with good attention to grooming and body habits  Patient presents ambulating in tennis shoes.   LOWER EXTREMITY EXAM:  VASCULAR: Dorsalis pedis pulses 0/4 bilateral and Posterior Tibial pulses 1/4 bilateral. Capillary fill time <4 seconds bilateral. Mild edema observed bilateral. Varicosities present bilateral. Skin temperature of the lower extremities is warm to warm, proximal to distal. Hair growth absent bilateral.  DERMATOLOGICAL: No skin rashes, subcutaneous nodules, lesions, or ulcers observed bilateral. Nails 1,2,3,4,5 bilateral elongated, thickened, and discolored with subungual debris. Webspaces 1,2,3,4 bilateral clean, dry and without evidence of break in skin integrity.   NEUROLOGICAL: Light touch, sharp-dull, proprioception all present and equal bilaterally.  Vibratory sensation intact at bilateral hallux. Protective sensation intact at sites as tested with a [place]-Vickie 5.07 monofilament.   MUSCULOSKELETAL: Muscle strength is 5/5 for foot inverters, everters, plantarflexors, and dorsiflexors. Muscle tone is normal. Hammertoes noted on right foot.           Assessment:       ICD-10-CM ICD-9-CM   1. Type II diabetes mellitus with peripheral circulatory disorder  E11.51 250.70     443.81   2. Dermatophytosis of nail  B35.1 110.1         Plan:   Type II diabetes mellitus with peripheral circulatory disorder    Dermatophytosis of nail    I counseled the patient on his conditions, regarding findings of my examination, my impressions,  and usual treatment plan.   Appointment spent on education about the diabetic foot, neuropathy, and prevention of limb loss.  Shoe inspection. Diabetic Foot Education. Patient reminded of the importance of good nutrition and blood sugar control to help prevent podiatric complications of diabetes. Patient instructed on proper foot hygeine. We discussed wearing proper shoe gear, daily foot inspections, never walking without protective shoe gear, never putting sharp instruments to feet.    With patient's permission, nails 1-5 bilateral were debrided/trimmed in length and thickness aggressively to their soft tissue attachment mechanically and with electric , removing all offending nail and debris. Patient relates relief following the procedure.  Patient  will continue to monitor the areas daily, inspect feet, wear protective shoe gear when ambulatory, moisturizer to maintain skin integrity. Patient reminded of the importance of good nutrition and blood sugar control to help prevent podiatric complications of diabetes.  Patient to return 3 months or sooner if needed.        Maria C Dejesus DPM  [facility]

## 2024-02-01 ENCOUNTER — OFFICE VISIT (OUTPATIENT)
Dept: PODIATRY | Facility: CLINIC | Age: 79
End: 2024-02-01
Payer: MEDICARE

## 2024-02-01 VITALS — WEIGHT: 244.06 LBS | HEIGHT: 75 IN | BODY MASS INDEX: 30.34 KG/M2

## 2024-02-01 DIAGNOSIS — E11.51 TYPE II DIABETES MELLITUS WITH PERIPHERAL CIRCULATORY DISORDER: Primary | ICD-10-CM

## 2024-02-01 DIAGNOSIS — B35.1 DERMATOPHYTOSIS OF NAIL: ICD-10-CM

## 2024-02-01 PROCEDURE — 99499 UNLISTED E&M SERVICE: CPT | Mod: S$GLB,,, | Performed by: PODIATRIST

## 2024-02-01 PROCEDURE — 99999 PR PBB SHADOW E&M-EST. PATIENT-LVL III: CPT | Mod: PBBFAC,,, | Performed by: PODIATRIST

## 2024-02-01 PROCEDURE — 11721 DEBRIDE NAIL 6 OR MORE: CPT | Mod: Q8,S$GLB,, | Performed by: PODIATRIST

## 2024-02-01 NOTE — PROGRESS NOTES
Subjective:     Patient ID: Suraj Giraldo is a 78 y.o. male.    Chief Complaint: Nail Care (Diabetic pt wears tennis shoes, PCP Dr. Thacker last seen 10-27-23)    Suraj is a 78 y.o. male who presents to the clinic for evaluation and treatment of high risk feet. Suraj has a past medical history of CHF (congestive heart failure), Diabetes mellitus, type 2, Disorder of kidney and ureter, Hypertension, and Stroke. The patient's chief complaint is long, thick toenails. This patient has documented high risk feet requiring routine maintenance secondary to diabetes mellitis and those secondary complications of diabetes, as mentioned..    PCP: Deborah Thacker MD    Date Last Seen by PCP: 10/27/2023    Current shoe gear:  Affected Foot: Tennis shoes     Unaffected Foot: Tennis shoes    Hemoglobin A1C   Date Value Ref Range Status   10/27/2023 6.2 <=6.5 % Final     Comment:       This assay method is useful in the diagnosis of diabetes  mellitus, identification of patients at risk for developing  diabetes, and monitoring of patients with diabetes  mellitus.  Reference range information and glycemic goals  are based on recommendations from the ADA (American  Diabetes Association).    Hgb A1C Value                Glycemic Goal      <8%                          Less Stringent  <7%                          General (Non-Pregnant Adults)  <6.5%                        More Stringent  5.7% - 6.4%                  Increased risk for diabetes   06/20/2023 5.9 (H) 4.8 - 5.6 % Final     Comment:              Prediabetes: 5.7 - 6.4           Diabetes: >6.4           Glycemic control for adults with diabetes: <7.0   05/06/2022 5.8 <=6.5 % Final     Comment:       This assay method is useful in the diagnosis of diabetes  mellitus, identification of patients at risk for developing  diabetes, and monitoring of patients with diabetes  mellitus.  Reference range information and glycemic goals  are based on recommendations from the ADA  "(American  Diabetes Association).    Hgb A1C Value                Glycemic Goal      <8%                          Less Stringent  <7%                          General (Non-Pregnant Adults)  <6.5%                        More Stringent  5.7% - 6.4%                  Increased risk for diabetes       Patient Active Problem List   Diagnosis    Type II diabetes mellitus with peripheral circulatory disorder    CHF (congestive heart failure)    HTN (hypertension)       Medication List with Changes/Refills   Current Medications    ACETAMINOPHEN (TYLENOL) 500 MG TABLET    Take 500 mg by mouth every 6 (six) hours as needed.    AMLODIPINE (NORVASC) 10 MG TABLET    Take 10 mg by mouth once daily.    ATORVASTATIN (LIPITOR) 20 MG TABLET        BD ULTRA-FINE ANTHONY PEN NEEDLE 32 GAUGE X 5/32" NDLE        BENZONATATE (TESSALON) 100 MG CAPSULE    Take 100 mg by mouth 3 (three) times daily as needed.    BLOOD SUGAR DIAGNOSTIC (ACCU-CHEK KYLIE PLUS TEST STRP) STRP    Test twice daily    BLOOD-GLUCOSE METER MISC    USE TO MONITOR BLOOD GLUCOSE    DONEPEZIL (ARICEPT) 10 MG TABLET        FUROSEMIDE (LASIX) 20 MG TABLET    Take 20 mg by mouth daily as needed.    INSULIN GLARGINE 100 UNITS/ML (3ML) SUBQ PEN    Inject 20 Units into the skin.    IRON-VITAMIN C 100-250 MG, ICAR-C, 100-250 MG TAB    TAKE 1 TABLET BY MOUTH TWICE DAILY    LORAZEPAM (ATIVAN) 1 MG TABLET    TAKE 1 TABLET BY MOUTH TWICE DAILY AS NEEDED FOR ANXIETY    LOSARTAN (COZAAR) 100 MG TABLET        MEMANTINE (NAMENDA) 10 MG TAB        PANTOPRAZOLE (PROTONIX) 40 MG TABLET        SURE COMFORT LANCETS 30 GAUGE MISC        TERAZOSIN (HYTRIN) 2 MG CAPSULE        TRAZODONE (DESYREL) 50 MG TABLET    Take 50 mg by mouth once daily.    VITAMIN D (VITAMIN D3) 1000 UNITS TAB    TAKE 1 TABLET BY MOUTH DAILY       Review of patient's allergies indicates:  No Known Allergies    History reviewed. No pertinent surgical history.    History reviewed. No pertinent family history.    Social " "History     Socioeconomic History    Marital status:    Tobacco Use    Smoking status: Never    Smokeless tobacco: Former   Substance and Sexual Activity    Alcohol use: Never    Drug use: Never       Vitals:    02/01/24 0920   Weight: 110.7 kg (244 lb 0.8 oz)   Height: 6' 3" (1.905 m)   PainSc: 0-No pain       Hemoglobin A1C   Date Value Ref Range Status   10/27/2023 6.2 <=6.5 % Final     Comment:       This assay method is useful in the diagnosis of diabetes  mellitus, identification of patients at risk for developing  diabetes, and monitoring of patients with diabetes  mellitus.  Reference range information and glycemic goals  are based on recommendations from the ADA (American  Diabetes Association).    Hgb A1C Value                Glycemic Goal      <8%                          Less Stringent  <7%                          General (Non-Pregnant Adults)  <6.5%                        More Stringent  5.7% - 6.4%                  Increased risk for diabetes   06/20/2023 5.9 (H) 4.8 - 5.6 % Final     Comment:              Prediabetes: 5.7 - 6.4           Diabetes: >6.4           Glycemic control for adults with diabetes: <7.0   05/06/2022 5.8 <=6.5 % Final     Comment:       This assay method is useful in the diagnosis of diabetes  mellitus, identification of patients at risk for developing  diabetes, and monitoring of patients with diabetes  mellitus.  Reference range information and glycemic goals  are based on recommendations from the ADA (American  Diabetes Association).    Hgb A1C Value                Glycemic Goal      <8%                          Less Stringent  <7%                          General (Non-Pregnant Adults)  <6.5%                        More Stringent  5.7% - 6.4%                  Increased risk for diabetes       Review of Systems   Unable to perform ROS: Dementia   Constitutional:  Negative for chills and fever.   Respiratory:  Negative for shortness of breath.    Cardiovascular:  " Negative for chest pain, palpitations, orthopnea, claudication and leg swelling.   Gastrointestinal:  Negative for diarrhea, nausea and vomiting.   Musculoskeletal:  Negative for joint pain.   Skin:  Negative for rash.   Neurological:  Negative for dizziness, tingling, sensory change, focal weakness and weakness.   Psychiatric/Behavioral: Negative.               Objective:      PHYSICAL EXAM: Apperance: Alert and orient in no distress,well developed, and with good attention to grooming and body habits  Patient presents ambulating in tennis shoes.   LOWER EXTREMITY EXAM:  VASCULAR: Dorsalis pedis pulses 0/4 bilateral and Posterior Tibial pulses 1/4 bilateral. Capillary fill time <4 seconds bilateral. Mild edema observed bilateral. Varicosities present bilateral. Skin temperature of the lower extremities is warm to warm, proximal to distal. Hair growth absent bilateral.  DERMATOLOGICAL: No skin rashes, subcutaneous nodules, lesions, or ulcers observed bilateral. Nails 1,2,3,4,5 bilateral elongated, thickened, and discolored with subungual debris. Webspaces 1,2,3,4 bilateral clean, dry and without evidence of break in skin integrity.   NEUROLOGICAL: Light touch, sharp-dull, proprioception all present and equal bilaterally.  Vibratory sensation intact at bilateral hallux. Protective sensation intact at sites as tested with a [place]-Vickie 5.07 monofilament.   MUSCULOSKELETAL: Muscle strength is 5/5 for foot inverters, everters, plantarflexors, and dorsiflexors. Muscle tone is normal. Hammertoes noted on right foot.           Assessment:       ICD-10-CM ICD-9-CM   1. Type II diabetes mellitus with peripheral circulatory disorder  E11.51 250.70     443.81   2. Dermatophytosis of nail  B35.1 110.1         Plan:   Type II diabetes mellitus with peripheral circulatory disorder    Dermatophytosis of nail    I counseled the patient on his conditions, regarding findings of my examination, my impressions, and usual treatment  plan.   Appointment spent on education about the diabetic foot, neuropathy, and prevention of limb loss.  Shoe inspection. Diabetic Foot Education. Patient reminded of the importance of good nutrition and blood sugar control to help prevent podiatric complications of diabetes. Patient instructed on proper foot hygeine. We discussed wearing proper shoe gear, daily foot inspections, never walking without protective shoe gear, never putting sharp instruments to feet.    With patient's permission, nails 1-5 bilateral were debrided/trimmed in length and thickness aggressively to their soft tissue attachment mechanically and with electric , removing all offending nail and debris. Patient relates relief following the procedure.  Patient  will continue to monitor the areas daily, inspect feet, wear protective shoe gear when ambulatory, moisturizer to maintain skin integrity. Patient reminded of the importance of good nutrition and blood sugar control to help prevent podiatric complications of diabetes.  Patient to return 3 months or sooner if needed.        Maria C Dejesus DPM  [facility]

## 2024-04-01 ENCOUNTER — OFFICE VISIT (OUTPATIENT)
Dept: PODIATRY | Facility: CLINIC | Age: 79
End: 2024-04-01
Payer: MEDICARE

## 2024-04-01 VITALS — HEIGHT: 75 IN | BODY MASS INDEX: 30.34 KG/M2 | WEIGHT: 244.06 LBS

## 2024-04-01 DIAGNOSIS — B35.1 DERMATOPHYTOSIS OF NAIL: ICD-10-CM

## 2024-04-01 DIAGNOSIS — E11.51 TYPE II DIABETES MELLITUS WITH PERIPHERAL CIRCULATORY DISORDER: Primary | ICD-10-CM

## 2024-04-01 PROCEDURE — 99999 PR PBB SHADOW E&M-EST. PATIENT-LVL III: CPT | Mod: PBBFAC,,, | Performed by: PODIATRIST

## 2024-04-01 PROCEDURE — 99499 UNLISTED E&M SERVICE: CPT | Mod: S$GLB,,, | Performed by: PODIATRIST

## 2024-04-01 PROCEDURE — 11721 DEBRIDE NAIL 6 OR MORE: CPT | Mod: Q8,S$GLB,, | Performed by: PODIATRIST

## 2024-04-01 NOTE — PROGRESS NOTES
Subjective:     Patient ID: Suraj Giraldo is a 78 y.o. male.    Chief Complaint: Nail Care (Nail care (diabetic pt, wearing tennis shoes and socks, no complaints, last seen Pcp Dr. Thacker 3/1/24.))    Suraj is a 78 y.o. male who presents to the clinic for evaluation and treatment of high risk feet. Suraj has a past medical history of CHF (congestive heart failure), Diabetes mellitus, type 2, Disorder of kidney and ureter, Hypertension, and Stroke. The patient's chief complaint is long, thick toenails. This patient has documented high risk feet requiring routine maintenance secondary to diabetes mellitis and those secondary complications of diabetes, as mentioned..    PCP: Deborah Thacker MD    Date Last Seen by PCP: 03/01/2024    Current shoe gear:  Affected Foot: Tennis shoes     Unaffected Foot: Tennis shoes    Hemoglobin A1C   Date Value Ref Range Status   03/01/2024 5.8 <=6.5 % Final     Comment:       This assay method is useful in the diagnosis of diabetes  mellitus, identification of patients at risk for developing  diabetes, and monitoring of patients with diabetes  mellitus.  Reference range information and glycemic goals  are based on recommendations from the ADA (American  Diabetes Association).    Hgb A1C Value                Glycemic Goal      <8%                          Less Stringent  <7%                          General (Non-Pregnant Adults)  <6.5%                        More Stringent  5.7% - 6.4%                  Increased risk for diabetes   10/27/2023 6.2 <=6.5 % Final     Comment:       This assay method is useful in the diagnosis of diabetes  mellitus, identification of patients at risk for developing  diabetes, and monitoring of patients with diabetes  mellitus.  Reference range information and glycemic goals  are based on recommendations from the ADA (American  Diabetes Association).    Hgb A1C Value                Glycemic Goal      <8%                          Less  "Stringent  <7%                          General (Non-Pregnant Adults)  <6.5%                        More Stringent  5.7% - 6.4%                  Increased risk for diabetes   06/20/2023 5.9 (H) 4.8 - 5.6 % Final     Comment:              Prediabetes: 5.7 - 6.4           Diabetes: >6.4           Glycemic control for adults with diabetes: <7.0       Patient Active Problem List   Diagnosis    Type II diabetes mellitus with peripheral circulatory disorder    CHF (congestive heart failure)    HTN (hypertension)       Medication List with Changes/Refills   Current Medications    ACETAMINOPHEN (TYLENOL) 500 MG TABLET    Take 500 mg by mouth every 6 (six) hours as needed.    AMLODIPINE (NORVASC) 10 MG TABLET    Take 10 mg by mouth once daily.    ATORVASTATIN (LIPITOR) 20 MG TABLET        BD ULTRA-FINE ANTHONY PEN NEEDLE 32 GAUGE X 5/32" NDLE        BENZONATATE (TESSALON) 100 MG CAPSULE    Take 100 mg by mouth 3 (three) times daily as needed.    BLOOD SUGAR DIAGNOSTIC (ACCU-CHEK KYLIE PLUS TEST STRP) STRP    Test twice daily    BLOOD-GLUCOSE METER MISC    USE TO MONITOR BLOOD GLUCOSE    DONEPEZIL (ARICEPT) 10 MG TABLET        FUROSEMIDE (LASIX) 20 MG TABLET    Take 20 mg by mouth daily as needed.    INSULIN GLARGINE 100 UNITS/ML (3ML) SUBQ PEN    Inject 20 Units into the skin.    IRON-VITAMIN C 100-250 MG, ICAR-C, 100-250 MG TAB    TAKE 1 TABLET BY MOUTH TWICE DAILY    LORAZEPAM (ATIVAN) 1 MG TABLET    TAKE 1 TABLET BY MOUTH TWICE DAILY AS NEEDED FOR ANXIETY    LOSARTAN (COZAAR) 100 MG TABLET        MEMANTINE (NAMENDA) 10 MG TAB        PANTOPRAZOLE (PROTONIX) 40 MG TABLET        SURE COMFORT LANCETS 30 GAUGE MISC        TERAZOSIN (HYTRIN) 2 MG CAPSULE        TRAZODONE (DESYREL) 50 MG TABLET    Take 50 mg by mouth once daily.    VITAMIN D (VITAMIN D3) 1000 UNITS TAB    TAKE 1 TABLET BY MOUTH DAILY       Review of patient's allergies indicates:  No Known Allergies    History reviewed. No pertinent surgical history.    History " "reviewed. No pertinent family history.    Social History     Socioeconomic History    Marital status:    Tobacco Use    Smoking status: Never    Smokeless tobacco: Former   Substance and Sexual Activity    Alcohol use: Never    Drug use: Never       Vitals:    04/01/24 0851   Weight: 110.7 kg (244 lb 0.8 oz)   Height: 6' 3" (1.905 m)       Hemoglobin A1C   Date Value Ref Range Status   03/01/2024 5.8 <=6.5 % Final     Comment:       This assay method is useful in the diagnosis of diabetes  mellitus, identification of patients at risk for developing  diabetes, and monitoring of patients with diabetes  mellitus.  Reference range information and glycemic goals  are based on recommendations from the ADA (American  Diabetes Association).    Hgb A1C Value                Glycemic Goal      <8%                          Less Stringent  <7%                          General (Non-Pregnant Adults)  <6.5%                        More Stringent  5.7% - 6.4%                  Increased risk for diabetes   10/27/2023 6.2 <=6.5 % Final     Comment:       This assay method is useful in the diagnosis of diabetes  mellitus, identification of patients at risk for developing  diabetes, and monitoring of patients with diabetes  mellitus.  Reference range information and glycemic goals  are based on recommendations from the ADA (American  Diabetes Association).    Hgb A1C Value                Glycemic Goal      <8%                          Less Stringent  <7%                          General (Non-Pregnant Adults)  <6.5%                        More Stringent  5.7% - 6.4%                  Increased risk for diabetes   06/20/2023 5.9 (H) 4.8 - 5.6 % Final     Comment:              Prediabetes: 5.7 - 6.4           Diabetes: >6.4           Glycemic control for adults with diabetes: <7.0       Review of Systems   Unable to perform ROS: Dementia   Constitutional:  Negative for chills and fever.   Respiratory:  Negative for shortness of " breath.    Cardiovascular:  Negative for chest pain, palpitations, orthopnea, claudication and leg swelling.   Gastrointestinal:  Negative for diarrhea, nausea and vomiting.   Musculoskeletal:  Negative for joint pain.   Skin:  Negative for rash.   Neurological:  Negative for dizziness, tingling, sensory change, focal weakness and weakness.   Psychiatric/Behavioral: Negative.               Objective:      PHYSICAL EXAM: Apperance: Alert and orient in no distress,well developed, and with good attention to grooming and body habits  Patient presents ambulating in tennis shoes.   LOWER EXTREMITY EXAM:  VASCULAR: Dorsalis pedis pulses 0/4 bilateral and Posterior Tibial pulses 1/4 bilateral. Capillary fill time <4 seconds bilateral. Mild edema observed bilateral. Varicosities present bilateral. Skin temperature of the lower extremities is warm to warm, proximal to distal. Hair growth absent bilateral.  DERMATOLOGICAL: No skin rashes, subcutaneous nodules, lesions, or ulcers observed bilateral. Nails 1,2,3,4,5 bilateral elongated, thickened, and discolored with subungual debris. Webspaces 1,2,3,4 bilateral clean, dry and without evidence of break in skin integrity.   NEUROLOGICAL: Light touch, sharp-dull, proprioception all present and equal bilaterally.  Vibratory sensation intact at bilateral hallux. Protective sensation intact at sites as tested with a [place]-Vickie 5.07 monofilament.   MUSCULOSKELETAL: Muscle strength is 5/5 for foot inverters, everters, plantarflexors, and dorsiflexors. Muscle tone is normal. Hammertoes noted on right foot.           Assessment:       ICD-10-CM ICD-9-CM   1. Type II diabetes mellitus with peripheral circulatory disorder  E11.51 250.70     443.81   2. Dermatophytosis of nail  B35.1 110.1         Plan:   Type II diabetes mellitus with peripheral circulatory disorder    Dermatophytosis of nail    I counseled the patient on his conditions, regarding findings of my examination, my  impressions, and usual treatment plan.   Appointment spent on education about the diabetic foot, neuropathy, and prevention of limb loss.  Shoe inspection. Diabetic Foot Education. Patient reminded of the importance of good nutrition and blood sugar control to help prevent podiatric complications of diabetes. Patient instructed on proper foot hygeine. We discussed wearing proper shoe gear, daily foot inspections, never walking without protective shoe gear, never putting sharp instruments to feet.    With patient's permission, nails 1-5 bilateral were debrided/trimmed in length and thickness aggressively to their soft tissue attachment mechanically and with electric , removing all offending nail and debris. Patient relates relief following the procedure.  Patient  will continue to monitor the areas daily, inspect feet, wear protective shoe gear when ambulatory, moisturizer to maintain skin integrity. Patient reminded of the importance of good nutrition and blood sugar control to help prevent podiatric complications of diabetes.  Patient to return 3 months or sooner if needed.        Maria C Dejesus DPM  [facility]            no

## 2024-06-03 ENCOUNTER — OFFICE VISIT (OUTPATIENT)
Dept: PODIATRY | Facility: CLINIC | Age: 79
End: 2024-06-03
Payer: MEDICARE

## 2024-06-03 DIAGNOSIS — B35.1 DERMATOPHYTOSIS OF NAIL: ICD-10-CM

## 2024-06-03 DIAGNOSIS — E11.51 TYPE II DIABETES MELLITUS WITH PERIPHERAL CIRCULATORY DISORDER: ICD-10-CM

## 2024-06-03 DIAGNOSIS — E11.9 ENCOUNTER FOR COMPREHENSIVE DIABETIC FOOT EXAMINATION, TYPE 2 DIABETES MELLITUS: Primary | ICD-10-CM

## 2024-06-03 PROCEDURE — 1101F PT FALLS ASSESS-DOCD LE1/YR: CPT | Mod: CPTII,S$GLB,, | Performed by: PODIATRIST

## 2024-06-03 PROCEDURE — 3288F FALL RISK ASSESSMENT DOCD: CPT | Mod: CPTII,S$GLB,, | Performed by: PODIATRIST

## 2024-06-03 PROCEDURE — 1160F RVW MEDS BY RX/DR IN RCRD: CPT | Mod: CPTII,S$GLB,, | Performed by: PODIATRIST

## 2024-06-03 PROCEDURE — 99999 PR PBB SHADOW E&M-EST. PATIENT-LVL III: CPT | Mod: PBBFAC,,, | Performed by: PODIATRIST

## 2024-06-03 PROCEDURE — 11721 DEBRIDE NAIL 6 OR MORE: CPT | Mod: Q8,S$GLB,, | Performed by: PODIATRIST

## 2024-06-03 PROCEDURE — 1159F MED LIST DOCD IN RCRD: CPT | Mod: CPTII,S$GLB,, | Performed by: PODIATRIST

## 2024-06-03 PROCEDURE — 99213 OFFICE O/P EST LOW 20 MIN: CPT | Mod: 25,S$GLB,, | Performed by: PODIATRIST

## 2024-06-03 NOTE — PROGRESS NOTES
Subjective:     Patient ID: Suraj Giraldo is a 78 y.o. male.    Chief Complaint: Nail Care    Suraj is a 78 y.o. male who presents to the clinic for evaluation and treatment of high risk feet. Suraj has a past medical history of CHF (congestive heart failure), Diabetes mellitus, type 2, Disorder of kidney and ureter, Hypertension, and Stroke. The patient's chief complaint is long, thick toenails. This patient has documented high risk feet requiring routine maintenance secondary to diabetes mellitis and those secondary complications of diabetes, as mentioned..    PCP: Deborah Thacker MD    Date Last Seen by PCP: 04/26/2024    Current shoe gear:  Affected Foot: Tennis shoes     Unaffected Foot: Tennis shoes    Hemoglobin A1C   Date Value Ref Range Status   03/01/2024 5.8 <=6.5 % Final     Comment:       This assay method is useful in the diagnosis of diabetes  mellitus, identification of patients at risk for developing  diabetes, and monitoring of patients with diabetes  mellitus.  Reference range information and glycemic goals  are based on recommendations from the ADA (American  Diabetes Association).    Hgb A1C Value                Glycemic Goal      <8%                          Less Stringent  <7%                          General (Non-Pregnant Adults)  <6.5%                        More Stringent  5.7% - 6.4%                  Increased risk for diabetes   10/27/2023 6.2 <=6.5 % Final     Comment:       This assay method is useful in the diagnosis of diabetes  mellitus, identification of patients at risk for developing  diabetes, and monitoring of patients with diabetes  mellitus.  Reference range information and glycemic goals  are based on recommendations from the ADA (American  Diabetes Association).    Hgb A1C Value                Glycemic Goal      <8%                          Less Stringent  <7%                          General (Non-Pregnant Adults)  <6.5%                        More Stringent  5.7% -  "6.4%                  Increased risk for diabetes   06/20/2023 5.9 (H) 4.8 - 5.6 % Final     Comment:              Prediabetes: 5.7 - 6.4           Diabetes: >6.4           Glycemic control for adults with diabetes: <7.0       Patient Active Problem List   Diagnosis    Type II diabetes mellitus with peripheral circulatory disorder    CHF (congestive heart failure)    HTN (hypertension)       Medication List with Changes/Refills   Current Medications    ACETAMINOPHEN (TYLENOL) 500 MG TABLET    Take 500 mg by mouth every 6 (six) hours as needed.    AMLODIPINE (NORVASC) 10 MG TABLET    Take 10 mg by mouth once daily.    ATORVASTATIN (LIPITOR) 20 MG TABLET        BD ULTRA-FINE ANTHONY PEN NEEDLE 32 GAUGE X 5/32" NDLE        BENZONATATE (TESSALON) 100 MG CAPSULE    Take 100 mg by mouth 3 (three) times daily as needed.    BLOOD SUGAR DIAGNOSTIC (ACCU-CHEK KYLIE PLUS TEST STRP) STRP    Test twice daily    BLOOD-GLUCOSE METER MISC    USE TO MONITOR BLOOD GLUCOSE    DONEPEZIL (ARICEPT) 10 MG TABLET        FUROSEMIDE (LASIX) 20 MG TABLET    Take 20 mg by mouth daily as needed.    INSULIN GLARGINE 100 UNITS/ML (3ML) SUBQ PEN    Inject 20 Units into the skin.    IRON-VITAMIN C 100-250 MG, ICAR-C, 100-250 MG TAB    TAKE 1 TABLET BY MOUTH TWICE DAILY    LORAZEPAM (ATIVAN) 1 MG TABLET    TAKE 1 TABLET BY MOUTH TWICE DAILY AS NEEDED FOR ANXIETY    LOSARTAN (COZAAR) 100 MG TABLET        MEMANTINE (NAMENDA) 10 MG TAB        PANTOPRAZOLE (PROTONIX) 40 MG TABLET        SURE COMFORT LANCETS 30 GAUGE MISC        TERAZOSIN (HYTRIN) 2 MG CAPSULE        TRAZODONE (DESYREL) 50 MG TABLET    Take 50 mg by mouth once daily.    VITAMIN D (VITAMIN D3) 1000 UNITS TAB    TAKE 1 TABLET BY MOUTH DAILY       Review of patient's allergies indicates:  No Known Allergies    History reviewed. No pertinent surgical history.    No family history on file.    Social History     Socioeconomic History    Marital status:    Tobacco Use    Smoking status: " Never    Smokeless tobacco: Former   Substance and Sexual Activity    Alcohol use: Never    Drug use: Never     Social Determinants of Health     Financial Resource Strain: Low Risk  (6/13/2023)    [facility] and [facility]    Overall Financial Resource Strain (CARDIA)     Difficulty of Paying Living Expenses: Not hard at all   Food Insecurity: No Food Insecurity (6/13/2023)    [facility]    Hunger Vital Sign     Worried About Running Out of Food in the Last Year: Never true     Ran Out of Food in the Last Year: Never true   Transportation Needs: No Transportation Needs (6/13/2023)    [facility]    PRAPARE - Transportation     Lack of Transportation (Medical): No     Lack of Transportation (Non-Medical): No   Physical Activity: Inactive (6/13/2023)    [facility], [facility] and [facility]    Exercise Vital Sign     Days of Exercise per Week: 0 days     Minutes of Exercise per Session: 0 min   Stress: No Stress Concern Present (6/13/2023)    [facility], [facility] and [facility]    [employer] - Occupational Stress Questionnaire     Feeling of Stress : Not at all   Housing Stability: Low Risk   (6/13/2023)    [facility] and [facility]ates, [facility]idi[facility] and Affiliates    Housing Stability Vital Sign     Unable to Pay for Housing in the Last Year: No     Number of Places Lived in the Last Year: 1     In the last 12 months, was there a time when you did not have a steady place to sleep or slept in a shelter (including now)?: No       There were no vitals filed for this visit.      Hemoglobin A1C   Date Value Ref Range Status   03/01/2024 5.8 <=6.5 % Final     Comment:       This assay method is useful in the diagnosis of diabetes  mellitus, identification of patients at risk for developing  diabetes, and monitoring of patients with diabetes  mellitus.  Reference range information and glycemic goals  are based on recommendations from the ADA (American  Diabetes Association).    Hgb A1C Value                Glycemic Goal      <8%                          Less Stringent  <7%                          General (Non-Pregnant Adults)  <6.5%                        More Stringent  5.7% - 6.4%                  Increased risk for diabetes   10/27/2023 6.2 <=6.5 % Final     Comment:       This assay method is useful in the diagnosis of diabetes  mellitus, identification of patients at risk for developing  diabetes, and monitoring of patients with diabetes  mellitus.  Reference range information and glycemic goals  are based on recommendations from the ADA (American  Diabetes Association).    Hgb A1C Value                Glycemic Goal      <8%                          Less Stringent  <7%                          General (Non-Pregnant Adults)  <6.5%                        More Stringent  5.7% - 6.4%                  Increased risk for diabetes   06/20/2023 5.9 (H) 4.8 - 5.6 % Final     Comment:              Prediabetes: 5.7 - 6.4           Diabetes: >6.4           Glycemic control for adults with  diabetes: <7.0       Review of Systems   Unable to perform ROS: Dementia   Constitutional:  Negative for chills and fever.   Respiratory:  Negative for shortness of breath.    Cardiovascular:  Negative for chest pain, palpitations, orthopnea, claudication and leg swelling.   Gastrointestinal:  Negative for diarrhea, nausea and vomiting.   Musculoskeletal:  Negative for joint pain.   Skin:  Negative for rash.   Neurological:  Negative for dizziness, tingling, sensory change, focal weakness and weakness.   Psychiatric/Behavioral: Negative.               Objective:      PHYSICAL EXAM: Apperance: Alert and orient in no distress,well developed, and with good attention to grooming and body habits  Patient presents ambulating in tennis shoes.   LOWER EXTREMITY EXAM:  VASCULAR: Dorsalis pedis pulses 0/4 bilateral and Posterior Tibial pulses 1/4 bilateral. Capillary fill time <4 seconds bilateral. Mild edema observed bilateral. Varicosities present bilateral. Skin temperature of the lower extremities is warm to warm, proximal to distal. Hair growth absent bilateral.  DERMATOLOGICAL: No skin rashes, subcutaneous nodules, lesions, or ulcers observed bilateral. Nails 1,2,3,4,5 bilateral elongated, thickened, and discolored with subungual debris. Webspaces 1,2,3,4 bilateral clean, dry and without evidence of break in skin integrity.   NEUROLOGICAL: Light touch, sharp-dull, proprioception all present and equal bilaterally.  Vibratory sensation intact at bilateral hallux. Protective sensation intact at sites as tested with a [place]-Vickie 5.07 monofilament.   MUSCULOSKELETAL: Muscle strength is 5/5 for foot inverters, everters, plantarflexors, and dorsiflexors. Muscle tone is normal. Hammertoes noted on right foot.           Assessment:       ICD-10-CM ICD-9-CM   1. Encounter for comprehensive diabetic foot examination, type 2 diabetes mellitus  E11.9 250.00   2. Type II diabetes mellitus with peripheral circulatory disorder   E11.51 250.70     443.81   3. Dermatophytosis of nail  B35.1 110.1         Plan:   Encounter for comprehensive diabetic foot examination, type 2 diabetes mellitus    Type II diabetes mellitus with peripheral circulatory disorder    Dermatophytosis of nail    I counseled the patient on his conditions, regarding findings of my examination, my impressions, and usual treatment plan.   This visit spent on counseling and coordination of care.  Appointment spent on education about the diabetic foot, neuropathy, and prevention of limb loss.  Shoe inspection. Diabetic Foot Education. Patient reminded of the importance of good nutrition and blood sugar control to help prevent podiatric complications of diabetes. Patient instructed on proper foot hygeine. We discussed wearing proper shoe gear, daily foot inspections, never walking without protective shoe gear, never putting sharp instruments to feet.    With patient's permission, nails 1-5 bilateral were debrided/trimmed in length and thickness aggressively to their soft tissue attachment mechanically and with electric , removing all offending nail and debris. Patient relates relief following the procedure.  Patient  will continue to monitor the areas daily, inspect feet, wear protective shoe gear when ambulatory, moisturizer to maintain skin integrity. Patient reminded of the importance of good nutrition and blood sugar control to help prevent podiatric complications of diabetes.  Patient to return 3 months or sooner if needed.        Maria C Dejesus DPM  [facility]

## 2024-08-09 ENCOUNTER — OFFICE VISIT (OUTPATIENT)
Dept: PODIATRY | Facility: CLINIC | Age: 79
End: 2024-08-09
Payer: MEDICARE

## 2024-08-09 VITALS — HEIGHT: 75 IN | BODY MASS INDEX: 30.34 KG/M2 | WEIGHT: 244.06 LBS

## 2024-08-09 DIAGNOSIS — B35.1 DERMATOPHYTOSIS OF NAIL: ICD-10-CM

## 2024-08-09 DIAGNOSIS — E11.51 TYPE II DIABETES MELLITUS WITH PERIPHERAL CIRCULATORY DISORDER: Primary | ICD-10-CM

## 2024-08-09 PROCEDURE — 99499 UNLISTED E&M SERVICE: CPT | Mod: S$GLB,,, | Performed by: PODIATRIST

## 2024-08-09 PROCEDURE — 11721 DEBRIDE NAIL 6 OR MORE: CPT | Mod: Q9,S$GLB,, | Performed by: PODIATRIST

## 2024-08-09 PROCEDURE — 99999 PR PBB SHADOW E&M-EST. PATIENT-LVL III: CPT | Mod: PBBFAC,,, | Performed by: PODIATRIST

## 2024-08-15 NOTE — PROGRESS NOTES
Subjective:     Patient ID: Suraj Giraldo is a 78 y.o. male.    Chief Complaint: Nail Care (Diabetic pt wears tennis shoes, PCP Dr. Thacker last seen 4-26-24)    Suraj is a 78 y.o. male who presents to the clinic for evaluation and treatment of high risk feet. Suraj has a past medical history of CHF (congestive heart failure), Diabetes mellitus, type 2, Disorder of kidney and ureter, Hypertension, and Stroke. The patient's chief complaint is long, thick toenails. This patient has documented high risk feet requiring routine maintenance secondary to diabetes mellitis and those secondary complications of diabetes, as mentioned..    PCP: Deborah Thacker MD    Date Last Seen by PCP: 04/26/2024    Current shoe gear:  Affected Foot: Tennis shoes     Unaffected Foot: Tennis shoes    Hemoglobin A1C   Date Value Ref Range Status   03/01/2024 5.8 <=6.5 % Final     Comment:       This assay method is useful in the diagnosis of diabetes  mellitus, identification of patients at risk for developing  diabetes, and monitoring of patients with diabetes  mellitus.  Reference range information and glycemic goals  are based on recommendations from the ADA (American  Diabetes Association).    Hgb A1C Value                Glycemic Goal      <8%                          Less Stringent  <7%                          General (Non-Pregnant Adults)  <6.5%                        More Stringent  5.7% - 6.4%                  Increased risk for diabetes   10/27/2023 6.2 <=6.5 % Final     Comment:       This assay method is useful in the diagnosis of diabetes  mellitus, identification of patients at risk for developing  diabetes, and monitoring of patients with diabetes  mellitus.  Reference range information and glycemic goals  are based on recommendations from the ADA (American  Diabetes Association).    Hgb A1C Value                Glycemic Goal      <8%                          Less Stringent  <7%                          General  "(Non-Pregnant Adults)  <6.5%                        More Stringent  5.7% - 6.4%                  Increased risk for diabetes   06/20/2023 5.9 (H) 4.8 - 5.6 % Final     Comment:              Prediabetes: 5.7 - 6.4           Diabetes: >6.4           Glycemic control for adults with diabetes: <7.0       Patient Active Problem List   Diagnosis    Type II diabetes mellitus with peripheral circulatory disorder    CHF (congestive heart failure)    HTN (hypertension)       Medication List with Changes/Refills   Current Medications    ACETAMINOPHEN (TYLENOL) 500 MG TABLET    Take 500 mg by mouth every 6 (six) hours as needed.    AMLODIPINE (NORVASC) 10 MG TABLET    Take 10 mg by mouth once daily.    ATORVASTATIN (LIPITOR) 20 MG TABLET        BD ULTRA-FINE ANTHONY PEN NEEDLE 32 GAUGE X 5/32" NDLE        BENZONATATE (TESSALON) 100 MG CAPSULE    Take 100 mg by mouth 3 (three) times daily as needed.    BLOOD SUGAR DIAGNOSTIC (ACCU-CHEK KYLIE PLUS TEST STRP) STRP    Test twice daily    BLOOD-GLUCOSE METER MISC    USE TO MONITOR BLOOD GLUCOSE    DONEPEZIL (ARICEPT) 10 MG TABLET        FUROSEMIDE (LASIX) 20 MG TABLET    Take 20 mg by mouth daily as needed.    INSULIN GLARGINE 100 UNITS/ML (3ML) SUBQ PEN    Inject 20 Units into the skin.    IRON-VITAMIN C 100-250 MG, ICAR-C, 100-250 MG TAB    TAKE 1 TABLET BY MOUTH TWICE DAILY    LORAZEPAM (ATIVAN) 1 MG TABLET    TAKE 1 TABLET BY MOUTH TWICE DAILY AS NEEDED FOR ANXIETY    LOSARTAN (COZAAR) 100 MG TABLET        MEMANTINE (NAMENDA) 10 MG TAB        PANTOPRAZOLE (PROTONIX) 40 MG TABLET        SURE COMFORT LANCETS 30 GAUGE MISC        TERAZOSIN (HYTRIN) 2 MG CAPSULE        TRAZODONE (DESYREL) 50 MG TABLET    Take 50 mg by mouth once daily.    VITAMIN D (VITAMIN D3) 1000 UNITS TAB    TAKE 1 TABLET BY MOUTH DAILY       Review of patient's allergies indicates:  No Known Allergies    History reviewed. No pertinent surgical history.    No family history on file.    Social History "     Socioeconomic History    Marital status:    Tobacco Use    Smoking status: Never    Smokeless tobacco: Former   Substance and Sexual Activity    Alcohol use: Never    Drug use: Never     Social Determinants of Health     Financial Resource Strain: Low Risk  (6/13/2023)    [facility], [facility]    Overall Financial Resource Strain (CARDIA)     Difficulty of Paying Living Expenses: Not hard at all   Food Insecurity: No Food Insecurity (6/13/2023)    [facility], [facility] and Affiliates    Hunger Vital Sign     Worried About Running Out of Food in the Last Year: Never true     Ran Out of Food in the Last Year: Never true   Transportation Needs: No Transportation Needs (6/13/2023)    [facility], [facility]    PRAPARE - Transportation     Lack of Transportation (Medical): No     Lack of Transportation (Non-Medical): No   Physical Activity: Inactive (6/13/2023)    [facility], [facility]    Exercise Vital Sign     Days of Exercise per Week: 0 days     Minutes of Exercise per Session: 0 min   Stress: No Stress Concern Present (6/13/2023)    [facility], [facility]    [facility] of Occupational Health - Occupational  "Stress Questionnaire     Feeling of Stress : Not at all   Housing Stability: Low Risk  (6/13/2023)    [facility], [facility] Its Sub[facility] and Affiliates    Housing Stability Vital Sign     Unable to Pay for Housing in the Last Year: No     Number of Places Lived in the Last Year: 1     Unstable Housing in the Last Year: No       Vitals:    08/09/24 1003   Weight: 110.7 kg (244 lb 0.8 oz)   Height: 6' 3" (1.905 m)   PainSc: 0-No pain         Hemoglobin A1C   Date Value Ref Range Status   03/01/2024 5.8 <=6.5 % Final     Comment:       This assay method is useful in the diagnosis of diabetes  mellitus, identification of patients at risk for developing  diabetes, and monitoring of patients with diabetes  mellitus.  Reference range information and glycemic goals  are based on recommendations from the ADA (American  Diabetes Association).    Hgb A1C Value                Glycemic Goal      <8%                          Less Stringent  <7%                          General (Non-Pregnant Adults)  <6.5%                        More Stringent  5.7% - 6.4%                  Increased risk for diabetes   10/27/2023 6.2 <=6.5 % Final     Comment:       This assay method is useful in the diagnosis of diabetes  mellitus, identification of patients at risk for developing  diabetes, and monitoring of patients with diabetes  mellitus.  Reference range information and glycemic goals  are based on recommendations from the ADA (American  Diabetes Association).    Hgb A1C Value                Glycemic Goal      <8%                          Less Stringent  <7%                          General (Non-Pregnant Adults)  <6.5%                        More Stringent  5.7% - 6.4%                  Increased risk for diabetes   06/20/2023 5.9 (H) 4.8 - 5.6 % Final     Comment:              Prediabetes: 5.7 - 6.4         "   Diabetes: >6.4           Glycemic control for adults with diabetes: <7.0       Review of Systems   Unable to perform ROS: Dementia   Constitutional:  Negative for chills and fever.   Respiratory:  Negative for shortness of breath.    Cardiovascular:  Negative for chest pain, palpitations, orthopnea, claudication and leg swelling.   Gastrointestinal:  Negative for diarrhea, nausea and vomiting.   Musculoskeletal:  Negative for joint pain.   Skin:  Negative for rash.   Neurological:  Negative for dizziness, tingling, sensory change, focal weakness and weakness.   Psychiatric/Behavioral: Negative.               Objective:      PHYSICAL EXAM: Apperance: Alert and orient in no distress,well developed, and with good attention to grooming and body habits  Patient presents ambulating in tennis shoes.   LOWER EXTREMITY EXAM:  VASCULAR: Dorsalis pedis pulses 0/4 bilateral and Posterior Tibial pulses 1/4 bilateral. Capillary fill time <4 seconds bilateral. Mild edema observed bilateral. Varicosities present bilateral. Skin temperature of the lower extremities is warm to warm, proximal to distal. Hair growth absent bilateral.  DERMATOLOGICAL: No skin rashes, subcutaneous nodules, lesions, or ulcers observed bilateral. Nails 1,2,3,4,5 bilateral elongated, thickened, and discolored with subungual debris. Webspaces 1,2,3,4 bilateral clean, dry and without evidence of break in skin integrity.   NEUROLOGICAL: Light touch, sharp-dull, proprioception all present and equal bilaterally.  Vibratory sensation intact at bilateral hallux. Protective sensation intact at sites as tested with a [place]-Vickie 5.07 monofilament.   MUSCULOSKELETAL: Muscle strength is 5/5 for foot inverters, everters, plantarflexors, and dorsiflexors. Muscle tone is normal. Hammertoes noted on right foot.           Assessment:       ICD-10-CM ICD-9-CM   1. Type II diabetes mellitus with peripheral circulatory disorder  E11.51 250.70     443.81   2.  Dermatophytosis of nail  B35.1 110.1         Plan:   Type II diabetes mellitus with peripheral circulatory disorder    Dermatophytosis of nail    I counseled the patient on his conditions, regarding findings of my examination, my impressions, and usual treatment plan.   Appointment spent on education about the diabetic foot, neuropathy, and prevention of limb loss.  Shoe inspection. Diabetic Foot Education. Patient reminded of the importance of good nutrition and blood sugar control to help prevent podiatric complications of diabetes. Patient instructed on proper foot hygeine. We discussed wearing proper shoe gear, daily foot inspections, never walking without protective shoe gear, never putting sharp instruments to feet.    With patient's permission, nails 1-5 bilateral were debrided/trimmed in length and thickness aggressively to their soft tissue attachment mechanically and with electric , removing all offending nail and debris. Patient relates relief following the procedure.  Patient  will continue to monitor the areas daily, inspect feet, wear protective shoe gear when ambulatory, moisturizer to maintain skin integrity. Patient reminded of the importance of good nutrition and blood sugar control to help prevent podiatric complications of diabetes.  Patient to return 3 months or sooner if needed.        Maria C Dejesus DPM  [facility]

## 2024-10-21 ENCOUNTER — OFFICE VISIT (OUTPATIENT)
Dept: PODIATRY | Facility: CLINIC | Age: 79
End: 2024-10-21
Payer: MEDICARE

## 2024-10-21 VITALS — HEIGHT: 75 IN | WEIGHT: 244.06 LBS | BODY MASS INDEX: 30.34 KG/M2

## 2024-10-21 DIAGNOSIS — B35.1 DERMATOPHYTOSIS OF NAIL: ICD-10-CM

## 2024-10-21 DIAGNOSIS — E11.51 TYPE II DIABETES MELLITUS WITH PERIPHERAL CIRCULATORY DISORDER: Primary | ICD-10-CM

## 2024-10-21 PROCEDURE — 99999 PR PBB SHADOW E&M-EST. PATIENT-LVL III: CPT | Mod: PBBFAC,,, | Performed by: PODIATRIST

## 2024-10-21 RX ORDER — POTASSIUM CHLORIDE 750 MG/1
10 TABLET, EXTENDED RELEASE ORAL
COMMUNITY
Start: 2024-08-30 | End: 2025-08-30

## 2024-10-21 RX ORDER — QUETIAPINE FUMARATE 100 MG/1
1 TABLET, FILM COATED ORAL NIGHTLY
COMMUNITY
Start: 2024-10-01

## 2024-10-21 RX ORDER — QUETIAPINE FUMARATE 25 MG/1
1 TABLET, FILM COATED ORAL NIGHTLY
COMMUNITY
Start: 2023-11-14

## 2024-10-21 RX ORDER — ONDANSETRON 4 MG/1
TABLET, ORALLY DISINTEGRATING ORAL
COMMUNITY
Start: 2024-10-01

## 2024-10-21 NOTE — PROGRESS NOTES
Subjective:     Patient ID: Suraj Giraldo is a 78 y.o. male.    Chief Complaint: Nail Care (Diabetic pt c/o right foot swelling, pt wears slide on shoes, PCP Deborah Thacker last seen 8/29/2024) and Foot Swelling    Suraj is a 78 y.o. male who presents to the clinic for evaluation and treatment of high risk feet. Suraj has a past medical history of CHF (congestive heart failure), Diabetes mellitus, type 2, Disorder of kidney and ureter, Hypertension, and Stroke. The patient's chief complaint is long, thick toenails. This patient has documented high risk feet requiring routine maintenance secondary to diabetes mellitis and those secondary complications of diabetes, as mentioned..    PCP: Deborah Thacker MD    Date Last Seen by PCP: 08/29/2024    Current shoe gear:  Affected Foot: Tennis shoes     Unaffected Foot: Tennis shoes    Hemoglobin A1C   Date Value Ref Range Status   08/29/2024 5.5 <=6.5 % Final     Comment:       This assay method is useful in the diagnosis of diabetes  mellitus, identification of patients at risk for developing  diabetes, and monitoring of patients with diabetes  mellitus.  Reference range information and glycemic goals  are based on recommendations from the ADA (American  Diabetes Association).    Hgb A1C Value                Glycemic Goal      <8%                          Less Stringent  <7%                          General (Non-Pregnant Adults)  <6.5%                        More Stringent  5.7% - 6.4%                  Increased risk for diabetes   03/01/2024 5.8 <=6.5 % Final     Comment:       This assay method is useful in the diagnosis of diabetes  mellitus, identification of patients at risk for developing  diabetes, and monitoring of patients with diabetes  mellitus.  Reference range information and glycemic goals  are based on recommendations from the ADA (American  Diabetes Association).    Hgb A1C Value                Glycemic Goal      <8%                       "    Less Stringent  <7%                          General (Non-Pregnant Adults)  <6.5%                        More Stringent  5.7% - 6.4%                  Increased risk for diabetes   10/27/2023 6.2 <=6.5 % Final     Comment:       This assay method is useful in the diagnosis of diabetes  mellitus, identification of patients at risk for developing  diabetes, and monitoring of patients with diabetes  mellitus.  Reference range information and glycemic goals  are based on recommendations from the ADA (American  Diabetes Association).    Hgb A1C Value                Glycemic Goal      <8%                          Less Stringent  <7%                          General (Non-Pregnant Adults)  <6.5%                        More Stringent  5.7% - 6.4%                  Increased risk for diabetes       Patient Active Problem List   Diagnosis    Type II diabetes mellitus with peripheral circulatory disorder    CHF (congestive heart failure)    HTN (hypertension)       Medication List with Changes/Refills   Current Medications    ACETAMINOPHEN (TYLENOL) 500 MG TABLET    Take 500 mg by mouth every 6 (six) hours as needed.    AMLODIPINE (NORVASC) 10 MG TABLET    Take 10 mg by mouth once daily.    ATORVASTATIN (LIPITOR) 20 MG TABLET        BD ULTRA-FINE ANTHONY PEN NEEDLE 32 GAUGE X 5/32" NDLE        BENZONATATE (TESSALON) 100 MG CAPSULE    Take 100 mg by mouth 3 (three) times daily as needed.    BLOOD SUGAR DIAGNOSTIC (ACCU-CHEK KYLIE PLUS TEST STRP) STRP    Test twice daily    BLOOD-GLUCOSE METER MISC    USE TO MONITOR BLOOD GLUCOSE    DONEPEZIL (ARICEPT) 10 MG TABLET        FUROSEMIDE (LASIX) 20 MG TABLET    Take 20 mg by mouth daily as needed.    INSULIN GLARGINE 100 UNITS/ML (3ML) SUBQ PEN    Inject 20 Units into the skin.    IRON-VITAMIN C 100-250 MG, ICAR-C, 100-250 MG TAB    TAKE 1 TABLET BY MOUTH TWICE DAILY    LORAZEPAM (ATIVAN) 1 MG TABLET    TAKE 1 TABLET BY MOUTH TWICE DAILY AS NEEDED FOR ANXIETY    LOSARTAN (COZAAR) 100 " MG TABLET        MEMANTINE (NAMENDA) 10 MG TAB        ONDANSETRON (ZOFRAN-ODT) 4 MG TBDL    TAKE 1 TABLET BY MOUTH EVERY 6 HOURS AS NEEDED FOR NAUSEA/VOMITING    PANTOPRAZOLE (PROTONIX) 40 MG TABLET        POTASSIUM CHLORIDE SA (K-DUR,KLOR-CON M) 10 MEQ TABLET    Take 10 mEq by mouth.    QUETIAPINE (SEROQUEL) 100 MG TAB    Take 1 tablet by mouth every evening.    QUETIAPINE (SEROQUEL) 25 MG TAB    Take 1 tablet by mouth every evening.    SURE COMFORT LANCETS 30 GAUGE MISC        TERAZOSIN (HYTRIN) 2 MG CAPSULE        TRAZODONE (DESYREL) 50 MG TABLET    Take 50 mg by mouth once daily.    VITAMIN D (VITAMIN D3) 1000 UNITS TAB    TAKE 1 TABLET BY MOUTH DAILY       Review of patient's allergies indicates:  No Known Allergies    History reviewed. No pertinent surgical history.    No family history on file.    Social History     Socioeconomic History    Marital status:    Tobacco Use    Smoking status: Never    Smokeless tobacco: Former   Substance and Sexual Activity    Alcohol use: Never    Drug use: Never     Social Drivers of Health     Financial Resource Strain: Low Risk  (8/28/2024)    Received from [facility] Its Subsidiaries and Affiliates    Overall Financial Resource Strain (CARDIA)     Difficulty of Paying Living Expenses: Not hard at all   Food Insecurity: No Food Insecurity (8/28/2024)    Received from [facility] and Its Subsidiaries and Affiliates    Hunger Vital Sign     Worried About Running Out of Food in the Last Year: Never true     Ran Out of Food in the Last Year: Never true   Transportation Needs: No Transportation Needs (8/28/2024)    Received from [facility] and Its Subsidiaries and Affiliates    PRAPARE - Transportation     Lack of Transportation (Medical): No     Lack of Transportation (Non-Medical): No   Physical Activity: Inactive (8/28/2024)    Received from  "[facility] and Its Subsidiaries and Affiliates    Exercise Vital Sign     Days of Exercise per Week: 0 days     Minutes of Exercise per Session: 0 min   Stress: Patient Declined (8/28/2024)    [facility]    [employer] of Occupational Health - Occupational Stress Questionnaire     Feeling of Stress : Patient declined   Housing Stability: Low Risk  (8/28/2024)    [facility]    Housing Stability Vital Sign     Unable to Pay for Housing in the Last Year: No     Number of Times Moved in the Last Year: 0     Homeless in the Last Year: No       Vitals:    10/21/24 1032   Weight: 110.7 kg (244 lb 0.8 oz)   Height: 6' 3" (1.905 m)         Hemoglobin A1C   Date Value Ref Range Status   08/29/2024 5.5 <=6.5 % Final     Comment:       This assay method is useful in the diagnosis of diabetes  mellitus, identification of patients at risk for developing  diabetes, and monitoring of patients with diabetes  mellitus.  Reference range information and glycemic goals  are based on recommendations from the ADA (American  Diabetes Association).    Hgb A1C Value                Glycemic Goal      <8%                          Less Stringent  <7%                          General (Non-Pregnant Adults)  <6.5%                        More Stringent  5.7% - 6.4%                  Increased risk for diabetes   03/01/2024 5.8 <=6.5 % Final     Comment:       This assay method is useful in the diagnosis of diabetes  mellitus, identification of patients at risk for developing  diabetes, and monitoring of patients with diabetes  mellitus.  Reference range information and glycemic goals  are based on recommendations from the ADA (American  Diabetes Association).    Hgb A1C Value                Glycemic Goal      <8%                   "        Less Stringent  <7%                          General (Non-Pregnant Adults)  <6.5%                        More Stringent  5.7% - 6.4%                  Increased risk for diabetes   10/27/2023 6.2 <=6.5 % Final     Comment:       This assay method is useful in the diagnosis of diabetes  mellitus, identification of patients at risk for developing  diabetes, and monitoring of patients with diabetes  mellitus.  Reference range information and glycemic goals  are based on recommendations from the ADA (American  Diabetes Association).    Hgb A1C Value                Glycemic Goal      <8%                          Less Stringent  <7%                          General (Non-Pregnant Adults)  <6.5%                        More Stringent  5.7% - 6.4%                  Increased risk for diabetes       Review of Systems   Unable to perform ROS: Dementia   Constitutional:  Negative for chills and fever.   Respiratory:  Negative for shortness of breath.    Cardiovascular:  Negative for chest pain, palpitations, orthopnea, claudication and leg swelling.   Gastrointestinal:  Negative for diarrhea, nausea and vomiting.   Musculoskeletal:  Negative for joint pain.   Skin:  Negative for rash.   Neurological:  Negative for dizziness, tingling, sensory change, focal weakness and weakness.   Psychiatric/Behavioral: Negative.               Objective:      PHYSICAL EXAM: Apperance: Alert and orient in no distress,well developed, and with good attention to grooming and body habits  Patient presents ambulating in tennis shoes.   LOWER EXTREMITY EXAM:  VASCULAR: Dorsalis pedis pulses 0/4 bilateral and Posterior Tibial pulses 1/4 bilateral. Capillary fill time <4 seconds bilateral. Mild edema observed bilateral. Varicosities present bilateral. Skin temperature of the lower extremities is warm to warm, proximal to distal. Hair growth absent bilateral.  DERMATOLOGICAL: No skin rashes, subcutaneous nodules, lesions, or ulcers observed  bilateral. Nails 1,2,3,4,5 bilateral elongated, thickened, and discolored with subungual debris. Webspaces 1,2,3,4 bilateral clean, dry and without evidence of break in skin integrity.   NEUROLOGICAL: Light touch, sharp-dull, proprioception all present and equal bilaterally.  Vibratory sensation intact at bilateral hallux. Protective sensation intact at sites as tested with a Bells-Vickie 5.07 monofilament.   MUSCULOSKELETAL: Muscle strength is 5/5 for foot inverters, everters, plantarflexors, and dorsiflexors. Muscle tone is normal. Hammertoes noted on right foot.           Assessment:       ICD-10-CM ICD-9-CM   1. Type II diabetes mellitus with peripheral circulatory disorder  E11.51 250.70     443.81   2. Dermatophytosis of nail  B35.1 110.1         Plan:   Type II diabetes mellitus with peripheral circulatory disorder    Dermatophytosis of nail    I counseled the patient on his conditions, regarding findings of my examination, my impressions, and usual treatment plan.   Appointment spent on education about the diabetic foot, neuropathy, and prevention of limb loss.  Shoe inspection. Diabetic Foot Education. Patient reminded of the importance of good nutrition and blood sugar control to help prevent podiatric complications of diabetes. Patient instructed on proper foot hygeine. We discussed wearing proper shoe gear, daily foot inspections, never walking without protective shoe gear, never putting sharp instruments to feet.    With patient's permission, nails 1-5 bilateral were debrided/trimmed in length and thickness aggressively to their soft tissue attachment mechanically and with electric , removing all offending nail and debris. Patient relates relief following the procedure.  Patient  will continue to monitor the areas daily, inspect feet, wear protective shoe gear when ambulatory, moisturizer to maintain skin integrity. Patient reminded of the importance of good nutrition and blood sugar control  to help prevent podiatric complications of diabetes.  Patient to return 3 months or sooner if needed.        Maria C Dejesus DPM  [facility]

## 2025-07-07 ENCOUNTER — TELEPHONE (OUTPATIENT)
Dept: PODIATRY | Facility: CLINIC | Age: 80
End: 2025-07-07
Payer: MEDICARE

## 2025-07-07 NOTE — TELEPHONE ENCOUNTER
I spoke to pt wife, she states her  is in a facility and is in a wheelchair and that he use to see a podiatrist in the facility but is un satisfy with the doctor. I made him an appt with Dr. Dejesus.    Copied from CRM #[id number]. Topic: Appointments - Appointment Access  >> Jul 7, 2025 10:08 AM Amy wrote:  Mrs. Lynn (wife) needs a call back at .[phone] in regards to seeing if the nurse can reach back out to her to schedule her  appointment. She stated that she's trying to see if he could be seen maybe mid morning or early evening if possible.